# Patient Record
Sex: FEMALE | Race: WHITE | Employment: FULL TIME | URBAN - METROPOLITAN AREA
[De-identification: names, ages, dates, MRNs, and addresses within clinical notes are randomized per-mention and may not be internally consistent; named-entity substitution may affect disease eponyms.]

---

## 2019-09-01 ENCOUNTER — HOSPITAL ENCOUNTER (INPATIENT)
Age: 64
LOS: 4 days | Discharge: HOME | DRG: 246 | End: 2019-09-05
Attending: EMERGENCY MEDICINE | Admitting: HOSPITALIST
Payer: COMMERCIAL

## 2019-09-01 DIAGNOSIS — I21.4 NSTEMI (NON-ST ELEVATED MYOCARDIAL INFARCTION) (HCC): ICD-10-CM

## 2019-09-01 DIAGNOSIS — R74.8 CARDIAC ENZYMES ELEVATED: ICD-10-CM

## 2019-09-01 DIAGNOSIS — R07.89 CHEST TIGHTNESS: Primary | ICD-10-CM

## 2019-09-01 DIAGNOSIS — R07.9 CHEST PAIN, UNSPECIFIED TYPE: ICD-10-CM

## 2019-09-01 LAB
ALBUMIN SERPL-MCNC: 4 G/DL (ref 3.5–5)
ALBUMIN/GLOB SERPL: 1.1 {RATIO} (ref 1.1–2.2)
ALP SERPL-CCNC: 134 U/L (ref 45–117)
ALT SERPL-CCNC: 22 U/L (ref 12–78)
ANION GAP SERPL CALC-SCNC: 7 MMOL/L (ref 5–15)
AST SERPL-CCNC: 16 U/L (ref 15–37)
ATRIAL RATE: 77 BPM
BASOPHILS # BLD: 0 K/UL (ref 0–0.1)
BASOPHILS NFR BLD: 0 % (ref 0–1)
BILIRUB SERPL-MCNC: 0.5 MG/DL (ref 0.2–1)
BUN SERPL-MCNC: 16 MG/DL (ref 6–20)
BUN/CREAT SERPL: 17 (ref 12–20)
CALCIUM SERPL-MCNC: 9.7 MG/DL (ref 8.5–10.1)
CALCULATED P AXIS, ECG09: 59 DEGREES
CALCULATED R AXIS, ECG10: -25 DEGREES
CALCULATED T AXIS, ECG11: -24 DEGREES
CHLORIDE SERPL-SCNC: 108 MMOL/L (ref 97–108)
CO2 SERPL-SCNC: 26 MMOL/L (ref 21–32)
COMMENT, HOLDF: NORMAL
CREAT SERPL-MCNC: 0.92 MG/DL (ref 0.55–1.02)
D DIMER PPP FEU-MCNC: 0.44 MG/L FEU (ref 0–0.65)
DIAGNOSIS, 93000: NORMAL
DIFFERENTIAL METHOD BLD: ABNORMAL
EOSINOPHIL # BLD: 0.1 K/UL (ref 0–0.4)
EOSINOPHIL NFR BLD: 1 % (ref 0–7)
ERYTHROCYTE [DISTWIDTH] IN BLOOD BY AUTOMATED COUNT: 14 % (ref 11.5–14.5)
GLOBULIN SER CALC-MCNC: 3.5 G/DL (ref 2–4)
GLUCOSE SERPL-MCNC: 107 MG/DL (ref 65–100)
HCT VFR BLD AUTO: 48.6 % (ref 35–47)
HGB BLD-MCNC: 16.1 G/DL (ref 11.5–16)
IMM GRANULOCYTES # BLD AUTO: 0 K/UL (ref 0–0.04)
IMM GRANULOCYTES NFR BLD AUTO: 0 % (ref 0–0.5)
LYMPHOCYTES # BLD: 2.2 K/UL (ref 0.8–3.5)
LYMPHOCYTES NFR BLD: 20 % (ref 12–49)
MCH RBC QN AUTO: 29.4 PG (ref 26–34)
MCHC RBC AUTO-ENTMCNC: 33.1 G/DL (ref 30–36.5)
MCV RBC AUTO: 88.8 FL (ref 80–99)
MONOCYTES # BLD: 0.5 K/UL (ref 0–1)
MONOCYTES NFR BLD: 5 % (ref 5–13)
NEUTS SEG # BLD: 8 K/UL (ref 1.8–8)
NEUTS SEG NFR BLD: 74 % (ref 32–75)
NRBC # BLD: 0 K/UL (ref 0–0.01)
NRBC BLD-RTO: 0 PER 100 WBC
P-R INTERVAL, ECG05: 142 MS
PLATELET # BLD AUTO: 335 K/UL (ref 150–400)
PMV BLD AUTO: 10.1 FL (ref 8.9–12.9)
POTASSIUM SERPL-SCNC: 4.2 MMOL/L (ref 3.5–5.1)
PROT SERPL-MCNC: 7.5 G/DL (ref 6.4–8.2)
Q-T INTERVAL, ECG07: 384 MS
QRS DURATION, ECG06: 78 MS
QTC CALCULATION (BEZET), ECG08: 434 MS
RBC # BLD AUTO: 5.47 M/UL (ref 3.8–5.2)
SAMPLES BEING HELD,HOLD: NORMAL
SODIUM SERPL-SCNC: 141 MMOL/L (ref 136–145)
TROPONIN I SERPL-MCNC: 0.08 NG/ML
TROPONIN I SERPL-MCNC: 0.18 NG/ML
VENTRICULAR RATE, ECG03: 77 BPM
WBC # BLD AUTO: 10.8 K/UL (ref 3.6–11)

## 2019-09-01 PROCEDURE — 93005 ELECTROCARDIOGRAM TRACING: CPT

## 2019-09-01 PROCEDURE — 99285 EMERGENCY DEPT VISIT HI MDM: CPT

## 2019-09-01 PROCEDURE — 85025 COMPLETE CBC W/AUTO DIFF WBC: CPT

## 2019-09-01 PROCEDURE — 99218 HC RM OBSERVATION: CPT

## 2019-09-01 PROCEDURE — 80053 COMPREHEN METABOLIC PANEL: CPT

## 2019-09-01 PROCEDURE — 65660000000 HC RM CCU STEPDOWN

## 2019-09-01 PROCEDURE — 36415 COLL VENOUS BLD VENIPUNCTURE: CPT

## 2019-09-01 PROCEDURE — 74011250636 HC RX REV CODE- 250/636: Performed by: INTERNAL MEDICINE

## 2019-09-01 PROCEDURE — 74011250637 HC RX REV CODE- 250/637: Performed by: INTERNAL MEDICINE

## 2019-09-01 PROCEDURE — 85379 FIBRIN DEGRADATION QUANT: CPT

## 2019-09-01 PROCEDURE — 84484 ASSAY OF TROPONIN QUANT: CPT

## 2019-09-01 RX ORDER — SODIUM CHLORIDE 0.9 % (FLUSH) 0.9 %
5-40 SYRINGE (ML) INJECTION AS NEEDED
Status: DISCONTINUED | OUTPATIENT
Start: 2019-09-01 | End: 2019-09-05 | Stop reason: HOSPADM

## 2019-09-01 RX ORDER — ENOXAPARIN SODIUM 100 MG/ML
1 INJECTION SUBCUTANEOUS EVERY 12 HOURS
Status: DISCONTINUED | OUTPATIENT
Start: 2019-09-01 | End: 2019-09-03

## 2019-09-01 RX ORDER — ACETAMINOPHEN 325 MG/1
650 TABLET ORAL
Status: DISCONTINUED | OUTPATIENT
Start: 2019-09-01 | End: 2019-09-05 | Stop reason: HOSPADM

## 2019-09-01 RX ORDER — TRIAMTERENE AND HYDROCHLOROTHIAZIDE 37.5; 25 MG/1; MG/1
1 CAPSULE ORAL DAILY
COMMUNITY
End: 2020-01-08

## 2019-09-01 RX ORDER — LOSARTAN POTASSIUM 50 MG/1
50 TABLET ORAL
Status: DISCONTINUED | OUTPATIENT
Start: 2019-09-01 | End: 2019-09-04

## 2019-09-01 RX ORDER — SODIUM CHLORIDE 0.9 % (FLUSH) 0.9 %
5-40 SYRINGE (ML) INJECTION EVERY 8 HOURS
Status: DISCONTINUED | OUTPATIENT
Start: 2019-09-01 | End: 2019-09-05 | Stop reason: HOSPADM

## 2019-09-01 RX ORDER — ROSUVASTATIN CALCIUM 10 MG/1
20 TABLET, COATED ORAL
Status: DISCONTINUED | OUTPATIENT
Start: 2019-09-01 | End: 2019-09-05 | Stop reason: HOSPADM

## 2019-09-01 RX ORDER — GUAIFENESIN 100 MG/5ML
81 LIQUID (ML) ORAL DAILY
Status: DISCONTINUED | OUTPATIENT
Start: 2019-09-02 | End: 2019-09-05 | Stop reason: HOSPADM

## 2019-09-01 RX ORDER — DILTIAZEM HYDROCHLORIDE 120 MG/1
120 CAPSULE, COATED, EXTENDED RELEASE ORAL DAILY
COMMUNITY
End: 2019-09-05

## 2019-09-01 RX ORDER — ROSUVASTATIN CALCIUM 5 MG/1
5 TABLET, COATED ORAL
COMMUNITY
End: 2019-09-05

## 2019-09-01 RX ORDER — OLMESARTAN MEDOXOMIL 20 MG/1
20 TABLET ORAL DAILY
COMMUNITY
End: 2019-09-05

## 2019-09-01 RX ORDER — CARVEDILOL 3.12 MG/1
3.12 TABLET ORAL 2 TIMES DAILY WITH MEALS
Status: DISCONTINUED | OUTPATIENT
Start: 2019-09-01 | End: 2019-09-05 | Stop reason: HOSPADM

## 2019-09-01 RX ADMIN — Medication 10 ML: at 20:36

## 2019-09-01 RX ADMIN — NITROGLYCERIN 1 INCH: 20 OINTMENT TOPICAL at 20:35

## 2019-09-01 RX ADMIN — ROSUVASTATIN CALCIUM 20 MG: 10 TABLET, COATED ORAL at 20:34

## 2019-09-01 RX ADMIN — CARVEDILOL 3.12 MG: 3.12 TABLET, FILM COATED ORAL at 17:57

## 2019-09-01 RX ADMIN — LOSARTAN POTASSIUM 50 MG: 50 TABLET ORAL at 20:34

## 2019-09-01 RX ADMIN — ENOXAPARIN SODIUM 80 MG: 80 INJECTION SUBCUTANEOUS at 17:57

## 2019-09-01 NOTE — Clinical Note
Lesion located in the Mid LAD. Balloon inserted. Balloon inflated using multiple inflations inflation technique. Inflation 2: Pressure: 8 lulu; Duration: 12 sec. Inflation 3: Pressure: 8 lulu; Duration: 11 sec.

## 2019-09-01 NOTE — Clinical Note
Lesion: Located in the Proximal LAD. Stent inserted. Stent deployed. First inflation pressure = 12 lulu; inflation time: 30 sec.

## 2019-09-01 NOTE — PROGRESS NOTES
Admission Medication Reconciliation:    Information obtained from:  Patient, pictures of Rx bottles  RxQuery data available¹:  NO    Comments/Recommendations: Updated PTA meds/reviewed patient's allergies. 1)  Patient is a pleasant, good historian who has no medications on her PTA last prior to this admission. She had pictures of all 4 of her Rx bottles on her phone which are used to update PTA med list as follows    2)  Medication changes (since last review): Added  - dilTIAZem CD (CARDIZEM CD) 120 mg ER capsule Sig: Take 120 mg by mouth daily. - olmesartan (BENICAR) 20 mg tablet Sig: Take 20 mg by mouth daily. - rosuvastatin (CRESTOR) 5 mg tablet Sig: Take 5 mg by mouth nightly. - sour cherry extract (TART CHERRY EXTRACT) 1,000 mg cap Sig: Take 2 Caps by mouth two (2) times a day. Takes 4 capsules twice daily for active gout flares   Indications: gout  - triamterene-hydroCHLOROthiazide (DYAZIDE) 37.5-25 mg per capsule Sig: Take 1 Cap by mouth daily  - vitamin C-multivitamin-mineral (EMERGEN-C) 500 mg chew Sig: Take 3 Tabs by mouth two (2) times a day. Adjusted  - none    Removed  - none    3)  No further follow-up     [address] [place] [address] benefit data reflects medications filled and processed through the patient's insurance, however   this data does NOT capture whether the medication was picked up or is currently being taken by the patient. Allergies:  Morphine; Pcn [penicillins]; and Renografin-60 [diatrizoate oracio-diatrizoat sod]    Significant PMH/Disease States:   Past Medical History:   Diagnosis Date    Gout     Hypertension     Kidney stone      Chief Complaint for this Admission:    Chief Complaint   Patient presents with    Chest Pain     Prior to Admission Medications:   Prior to Admission Medications   Prescriptions Last Dose Informant Patient Reported? Taking?   dilTIAZem CD (CARDIZEM CD) 120 mg ER capsule 9/1/2019 at Unknown time  Yes Yes   Sig: Take 120 mg by mouth daily.    olmesartan (BENICAR) 20 mg tablet 8/31/2019 at Unknown time  Yes Yes   Sig: Take 20 mg by mouth daily. rosuvastatin (CRESTOR) 5 mg tablet 8/31/2019 at Unknown time  Yes Yes   Sig: Take 5 mg by mouth nightly. sour cherry extract (TART CHERRY EXTRACT) 1,000 mg cap   Yes Yes   Sig: Take 2 Caps by mouth two (2) times a day. Takes 4 capsules twice daily for active gout flares   Indications: gout   triamterene-hydroCHLOROthiazide (DYAZIDE) 37.5-25 mg per capsule 9/1/2019 at Unknown time  Yes Yes   Sig: Take 1 Cap by mouth daily. vitamin C-multivitamin-mineral (EMERGEN-C) 500 mg chew   Yes Yes   Sig: Take 3 Tabs by mouth two (2) times a day. Facility-Administered Medications: None       Please contact the main inpatient pharmacy with any questions or concerns at [phone] and we will direct you to the clinical pharmacist covering this patient's care while in-house.     Thank you,   Brenda Ball, PHARMD

## 2019-09-01 NOTE — Clinical Note
Lesion located in the Proximal LAD. Balloon removed. Balloon inflated using multiple inflations inflation technique. Pressure = 10 lulu; Duration = 22 sec. Inflation 2: Pressure: 10 lulu; Duration: 36 sec.

## 2019-09-01 NOTE — Clinical Note
Lesion: Located in the Mid RCA. Stent inserted. Stent deployed. First inflation pressure = 15 lulu; inflation time: 30 sec.

## 2019-09-01 NOTE — ED PROVIDER NOTES
59 y.o. female with past medical history significant for gout, and HTN who presents from [facility] via family with chief complaint of chest pain. Pt states she began with chest \"tightness' on friday when she was back to school shopping with her grandchildren. Pt explains she sat down for a few moments and her symptoms subsided. Pt then explains she was at [facility] and her tightness came back. Pt states she had to stop and sit down multiple times and it just \"wouldn't pass\". Pt states today she has the same tightness and decided to go to [facility]. Pt states the reason she decided to seek medical attention today because her symptoms returned with only slight movement. At [facility] she was given 4 ASA. Pt states she has had similar episodes before however it would be \"one time and go away\". Today her longest episode of tightness was approximately 20 minutes. Pt denies hx of diabetes. Pt denies any SOB, leg pain, and abdominal pain. There are no other acute medical concerns at this time. Social hx: Current Smoker,   Significant FMHx: Father 4 Vessel Bypass, high b/p  PCP: Mahi Bain MD      Note written by Nita Lord, as dictated by Mal David MD 2:35 PM      The history is provided by the patient. No  was used. Past Medical History:   Diagnosis Date    Gout     Hypertension     Kidney stone        Past Surgical History:   Procedure Laterality Date    HX HYSTERECTOMY           No family history on file.     Social History     Socioeconomic History    Marital status: UNKNOWN     Spouse name: Not on file    Number of children: Not on file    Years of education: Not on file    Highest education level: Not on file   Occupational History    Not on file   Social Needs    Financial resource strain: Not on file    Food insecurity:     Worry: Not on file     Inability: Not on file    Transportation needs:     Medical: Not on file Non-medical: Not on file   Tobacco Use    Smoking status: Not on file   Substance and Sexual Activity    Alcohol use: Not on file    Drug use: Not on file    Sexual activity: Not on file   Lifestyle    Physical activity:     Days per week: Not on file     Minutes per session: Not on file    Stress: Not on file   Relationships    Social connections:     Talks on phone: Not on file     Gets together: Not on file     Attends Oriental orthodox service: Not on file     Active member of club or organization: Not on file     Attends meetings of clubs or organizations: Not on file     Relationship status: Not on file    Intimate partner violence:     Fear of current or ex partner: Not on file     Emotionally abused: Not on file     Physically abused: Not on file     Forced sexual activity: Not on file   Other Topics Concern    Not on file   Social History Narrative    Not on file         ALLERGIES: Morphine; Pcn [penicillins]; and Renografin-60 [diatrizoate oracio-diatrizoat sod]    Review of Systems   Constitutional: Negative for activity change, appetite change and fatigue. HENT: Negative for ear pain, facial swelling, sore throat and trouble swallowing. Eyes: Negative for pain, discharge and visual disturbance. Respiratory: Positive for chest tightness. Negative for shortness of breath and wheezing. Cardiovascular: Negative for chest pain and palpitations. Gastrointestinal: Negative for abdominal pain, blood in stool, nausea and vomiting. Genitourinary: Negative for difficulty urinating, flank pain and hematuria. Musculoskeletal: Negative for arthralgias, joint swelling, myalgias and neck pain. Skin: Negative for color change and rash. Neurological: Negative for dizziness, weakness, numbness and headaches. Hematological: Negative for adenopathy. Does not bruise/bleed easily. Psychiatric/Behavioral: Negative for behavioral problems, confusion and sleep disturbance.    All other systems reviewed and are negative. Vitals:    09/01/19 1255 09/01/19 1403   BP:  150/85   Pulse: 75 61   Resp:  16   Temp:  98.1 °F (36.7 °C)   SpO2: 98% 95%   Weight: 81.6 kg (180 lb)    Height: 5' 2\" (1.575 m)             Physical Exam   Constitutional: She is oriented to person, place, and time. She appears well-developed and well-nourished. No distress. HENT:   Head: Normocephalic and atraumatic. Nose: Nose normal.   Mouth/Throat: Oropharynx is clear and moist.   Eyes: Pupils are equal, round, and reactive to light. Conjunctivae and EOM are normal. No scleral icterus. Neck: Normal range of motion. Neck supple. No JVD present. No tracheal deviation present. No thyromegaly present. No carotid bruits noted. Cardiovascular: Normal rate, regular rhythm, normal heart sounds and intact distal pulses. Exam reveals no gallop and no friction rub. No murmur heard. Pulmonary/Chest: Effort normal and breath sounds normal. No respiratory distress. She has no wheezes. She has no rales. She exhibits no tenderness. Abdominal: Soft. Bowel sounds are normal. She exhibits no distension and no mass. There is no tenderness. There is no rebound and no guarding. Musculoskeletal: Normal range of motion. She exhibits no edema or tenderness. Lymphadenopathy:     She has no cervical adenopathy. Neurological: She is alert and oriented to person, place, and time. She has normal reflexes. No cranial nerve deficit. Coordination normal.   Skin: Skin is warm and dry. No rash noted. No erythema. Psychiatric: She has a normal mood and affect. Her behavior is normal. Judgment and thought content normal.   Nursing note and vitals reviewed.      Note written by Nita Drake, as dictated by May Eller MD 2:35 PM    MDM  Number of Diagnoses or Management Options  Cardiac enzymes elevated: new and requires workup  Chest tightness: new and requires workup     Amount and/or Complexity of Data Reviewed  Clinical lab tests: reviewed and ordered  Tests in the radiology section of CPT®: reviewed and ordered  Decide to obtain previous medical records or to obtain history from someone other than the patient: yes  Obtain history from someone other than the patient: yes  Review and summarize past medical records: yes  Discuss the patient with other providers: yes  Independent visualization of images, tracings, or specimens: yes    Risk of Complications, Morbidity, and/or Mortality  Presenting problems: high  Diagnostic procedures: high  Management options: high    Patient Progress  Patient progress: stable         Procedures      ED EKG interpretation:  Rhythm: Arhythmia ; and irregular. Rate (approx.): 77bpm; Axis: left axis deviation; Normal intervals; T wave inversion VIII-VI of questionable significance. Note written by Nita Bhatt, as dictated by Angel Marquez MD 2:48 PM     PROGRESS NOTE:  3:16 PM  Cardiology consult    Hospitalist Adrien for Admission  3:16 PM    ED Room Number: ER08/08  Patient Name and age: Emma Ruiz 59 y.o.  female  Working Diagnosis:   1. Chest tightness    2. Cardiac enzymes elevated      Readmission: no  Isolation Requirements:  no  Recommended Level of Care:  telemetry  Code Status:  Full Code  Department:[facility] Adult ED - 21   Other:       The patient's EKG reveals only some T wave inversion laterally. The troponin is 0.08 and the patient states that it was normal this more. Chest pain or tightness currently. She denies any shortness of breath and is feeling normal at this point. I have spoken with Dr. Kia Lazaro and he will see the patient in consult. Patient has had 4 aspirin per EMS. Hemodynamically she is stable. The patient will be admitted for possible unstable angina.   She voices understanding and is in

## 2019-09-01 NOTE — Clinical Note
Lesion located in the Proximal LAD. Balloon removed. Balloon inflated using multiple inflations inflation technique. Pressure = 12 lulu; Duration = 25 sec. Inflation 2: Pressure: 12 lulu; Duration: 22 sec. Inflation 3: Pressure: 12 lulu; Duration: 25 sec.

## 2019-09-01 NOTE — CONSULTS
S Cardiology Consult Note    Date of consult:  09/01/19  Date of admission: 9/1/2019  Primary Cardiologist: none  Physician Requesting consult: Dr Farrah Espinoza / Reason for consult: chest pain    History of the presenting illness: Jonas Jackson is a 59 y.o. who presented initially to [facility] urgent care with complaints of chest pain. She states that the chest pressure initially came on Friday. The pain was described as a tightness, located in the upper sternal area. It did not seem to radiate. The pain came and went yesterday when she was at [facility]. Seemed to have quite a close relationship to exertion - walking would bring on the pain and it would go away with rest. She has some associated clammy / sweaty spells associated with the pain at times as well. Pain seemed stronger today which lead her to seek medical attention. She received 4 baby aspirin and was sent to the ER [facility] ER. EKG shows sinus rhythm with PACs and NSTW changes, no definite ischemia. Her initial troponin here is marginally elevated at 0.08    Medical problems include HTN and hyperlipidemia. She is also a smoker, and has a FH of early CAD (her father had bypass at an early age). Past Medical History:   Diagnosis Date    Gout     Hypertension     Kidney stone        Prior to Admission medications    Medication Sig Start Date End Date Taking? Authorizing Provider   triamterene-hydroCHLOROthiazide (DYAZIDE) 37.5-25 mg per capsule Take 1 Cap by mouth daily. Yes Provider, Historical   dilTIAZem CD (CARDIZEM CD) 120 mg ER capsule Take 120 mg by mouth daily. Yes Provider, Historical   rosuvastatin (CRESTOR) 5 mg tablet Take 5 mg by mouth nightly. Yes Provider, Historical   olmesartan (BENICAR) 20 mg tablet Take 20 mg by mouth daily. Yes Provider, Historical   sour cherry extract (TART CHERRY EXTRACT) 1,000 mg cap Take 2 Caps by mouth two (2) times a day.  Takes 4 capsules twice daily for active gout flares Indications: gout   Yes Provider, Historical   vitamin C-multivitamin-mineral (EMERGEN-C) 500 mg chew Take 3 Tabs by mouth two (2) times a day. Yes Provider, Historical       No family history on file. Social History     Socioeconomic History    Marital status: UNKNOWN     Spouse name: Not on file    Number of children: Not on file    Years of education: Not on file    Highest education level: Not on file   Occupational History    Not on file   Social Needs    Financial resource strain: Not on file    Food insecurity:     Worry: Not on file     Inability: Not on file    Transportation needs:     Medical: Not on file     Non-medical: Not on file   Tobacco Use    Smoking status: Not on file   Substance and Sexual Activity    Alcohol use: Not on file    Drug use: Not on file    Sexual activity: Not on file   Lifestyle    Physical activity:     Days per week: Not on file     Minutes per session: Not on file    Stress: Not on file   Relationships    Social connections:     Talks on phone: Not on file     Gets together: Not on file     Attends Mandaen service: Not on file     Active member of club or organization: Not on file     Attends meetings of clubs or organizations: Not on file     Relationship status: Not on file    Intimate partner violence:     Fear of current or ex partner: Not on file     Emotionally abused: Not on file     Physically abused: Not on file     Forced sexual activity: Not on file   Other Topics Concern    Not on file   Social History Narrative    Not on file       Review of Systems   Constitutional: Negative for chills and fever. HENT: Negative for hearing loss and nosebleeds. Eyes: Negative for blurred vision and double vision. Respiratory: Negative for cough and sputum production. Cardiovascular: Positive for chest pain. Gastrointestinal: Negative for abdominal pain, nausea and vomiting. Genitourinary: Negative for frequency and urgency.    Musculoskeletal: Negative for back pain and joint pain. Skin: Negative for itching and rash. Neurological: Negative for dizziness and headaches. Endo/Heme/Allergies: Negative for environmental allergies. Does not bruise/bleed easily. Visit Vitals  /85 (BP 1 Location: Left arm, BP Patient Position: At rest)   Pulse 60   Temp 98.2 °F (36.8 °C)   Resp 17   Ht 5' 2\" (1.575 m)   Wt 81.6 kg (180 lb)   SpO2 97%   BMI 32.92 kg/m²     Physical Exam   Constitutional: She is oriented to person, place, and time and well-developed, well-nourished, and in no distress. HENT:   Head: Normocephalic and atraumatic. Eyes: Conjunctivae are normal. No scleral icterus. Neck: No JVD present. Cardiovascular: Normal rate, regular rhythm and normal heart sounds. Exam reveals no gallop. No murmur heard. Pulmonary/Chest: Effort normal and breath sounds normal. No stridor. No respiratory distress. She has no wheezes. Abdominal: Soft. She exhibits no distension. Musculoskeletal: Normal range of motion. She exhibits no deformity. Neurological: She is alert and oriented to person, place, and time. Skin: Skin is warm and dry.    Psychiatric: Mood and affect normal.       Lab review:  BMP:   Lab Results   Component Value Date/Time     09/01/2019 12:59 PM    K 4.2 09/01/2019 12:59 PM     09/01/2019 12:59 PM    CO2 26 09/01/2019 12:59 PM    AGAP 7 09/01/2019 12:59 PM     (H) 09/01/2019 12:59 PM    BUN 16 09/01/2019 12:59 PM    CREA 0.92 09/01/2019 12:59 PM    GFRAA >60 09/01/2019 12:59 PM    GFRNA >60 09/01/2019 12:59 PM        CBC:  Lab Results   Component Value Date/Time    WBC 10.8 09/01/2019 12:59 PM    HGB 16.1 (H) 09/01/2019 12:59 PM    HCT 48.6 (H) 09/01/2019 12:59 PM    PLATELET 358 08/64/9003 12:59 PM    MCV 88.8 09/01/2019 12:59 PM       All Cardiac Markers in the last 24 hours:    Lab Results   Component Value Date/Time    TROIQ 0.08 (H) 09/01/2019 12:59 PM       Data review:  EKG tracing personally reviewed:  sinus rhythm with PACs and NSTW changes, no definite ischemia. Assessment:    1. NSTEMI  2. HTN  3. Hyperlipidemia  4. Tobacco use    Her symptoms are fairly typical for a crescendo pattern unstable angina. Troponin is marginally abnormal, but in the context of her symptoms and risk factors, I think she does truly seem to have ACS. Recommendations / Plan:    Would check troponin every 6hrs until peaked. Continue with aspirin, and will add Lovenox 1mg/kg every 12hrs  Add coreg in place of her usual diltiazem  Increase rosuvastatin dose to 20mg daily. Repeat AM lipids  Echocardiogram for EF assessment    Will plan on [address] [place] [address] Tuesday morning, possible PCI. Scheduled for Tues 7am.   Please keep her NPO from [place] Monday night. She is being admitted to the hospitalist service. Signed:  Paola Adam [facility]  Interventional Cardiology  09/01/19

## 2019-09-01 NOTE — Clinical Note
Lesion located in the Proximal LAD. Balloon inserted. Balloon inflated using multiple inflations inflation technique. Pressure = 6 lulu; Duration = 25 sec. Inflation 2: Pressure: 6 lulu; Duration: 30 sec.

## 2019-09-01 NOTE — Clinical Note
Lesion: Located in the Proximal RCA. Stent inserted. Stent deployed. First inflation pressure = 20 lulu; inflation time: 30 sec.

## 2019-09-01 NOTE — Clinical Note
Lesion located in the Proximal RCA. Balloon balloon re-inflated. Pressure = 12 lulu; Duration = 22 sec.

## 2019-09-01 NOTE — Clinical Note
Lesion located in the Proximal RCA. Balloon inserted. Balloon inflated using single inflation technique. Pressure = 12 lulu; Duration = 20 sec.

## 2019-09-01 NOTE — Clinical Note
Lesion: Located in the Mid LAD. Stent inserted. Multiple inflations used. First inflation pressure = 12 lulu; inflation time: 30 sec. Second inflation pressure: 14 lulu; inflation time: 30 sec.

## 2019-09-01 NOTE — Clinical Note
Lesion located in the Proximal RCA. Balloon inserted. Balloon inflated using multiple inflations inflation technique. Pressure = 18 lulu; Duration = 40 sec. Inflation 2: Pressure: 20 lulu; Duration: 30 sec.

## 2019-09-01 NOTE — ED TRIAGE NOTES
Arrived form home with  c/o mid sternum chest pain since Friday. Pain worsen with movement. Patient went to [facility] today and received 4 baby ASA.

## 2019-09-01 NOTE — ED NOTES
1751 TRANSFER - OUT REPORT:    Verbal report given to Emily(name) on Iliana Aguilera  being transferred to [facility](unit) for routine progression of care       Report consisted of patients Situation, Background, Assessment and   Recommendations(SBAR). Information from the following report(s) SBAR was reviewed with the receiving nurse. Lines:   Peripheral IV 09/01/19 Right Antecubital (Active)        Opportunity for questions and clarification was provided.       Patient transported with:   Monitor  Registered Nurse  Tech

## 2019-09-02 LAB
CHOLEST SERPL-MCNC: 196 MG/DL
HDLC SERPL-MCNC: 25 MG/DL
HDLC SERPL: 7.8 {RATIO} (ref 0–5)
LDLC SERPL CALC-MCNC: 121.4 MG/DL (ref 0–100)
LIPID PROFILE,FLP: ABNORMAL
TRIGL SERPL-MCNC: 248 MG/DL (ref ?–150)
TROPONIN I SERPL-MCNC: 0.13 NG/ML
VLDLC SERPL CALC-MCNC: 49.6 MG/DL

## 2019-09-02 PROCEDURE — 74011250636 HC RX REV CODE- 250/636: Performed by: INTERNAL MEDICINE

## 2019-09-02 PROCEDURE — 36415 COLL VENOUS BLD VENIPUNCTURE: CPT

## 2019-09-02 PROCEDURE — 74011250637 HC RX REV CODE- 250/637: Performed by: INTERNAL MEDICINE

## 2019-09-02 PROCEDURE — 65660000000 HC RM CCU STEPDOWN

## 2019-09-02 PROCEDURE — 80061 LIPID PANEL: CPT

## 2019-09-02 PROCEDURE — 99218 HC RM OBSERVATION: CPT

## 2019-09-02 RX ORDER — IBUPROFEN 200 MG
1 TABLET ORAL EVERY 24 HOURS
Status: DISCONTINUED | OUTPATIENT
Start: 2019-09-02 | End: 2019-09-05 | Stop reason: HOSPADM

## 2019-09-02 RX ADMIN — ROSUVASTATIN CALCIUM 20 MG: 10 TABLET, COATED ORAL at 21:36

## 2019-09-02 RX ADMIN — LOSARTAN POTASSIUM 50 MG: 50 TABLET ORAL at 08:39

## 2019-09-02 RX ADMIN — ENOXAPARIN SODIUM 80 MG: 80 INJECTION SUBCUTANEOUS at 18:12

## 2019-09-02 RX ADMIN — ENOXAPARIN SODIUM 80 MG: 80 INJECTION SUBCUTANEOUS at 05:12

## 2019-09-02 RX ADMIN — CARVEDILOL 3.12 MG: 3.12 TABLET, FILM COATED ORAL at 08:39

## 2019-09-02 RX ADMIN — Medication 10 ML: at 21:36

## 2019-09-02 RX ADMIN — ACETAMINOPHEN 650 MG: 325 TABLET, FILM COATED ORAL at 21:36

## 2019-09-02 RX ADMIN — CARVEDILOL 3.12 MG: 3.12 TABLET, FILM COATED ORAL at 18:12

## 2019-09-02 RX ADMIN — Medication 10 ML: at 15:38

## 2019-09-02 RX ADMIN — ASPIRIN 81 MG CHEWABLE TABLET 81 MG: 81 TABLET CHEWABLE at 08:39

## 2019-09-02 RX ADMIN — Medication 10 ML: at 05:09

## 2019-09-02 NOTE — PROGRESS NOTES
2030: Bedside and Verbal shift change report given to Joon Liriano RN (oncoming nurse) by Gianna Grande RN (offgoing nurse). Report included the following information SBAR, Kardex, Intake/Output, MAR, Recent Results and Cardiac Rhythm NSR.   1930: Bedside and Verbal shift change report given to [place] [id number] (oncoming nurse) by Joon Liriano RN (offgoing nurse). Report included the following information SBAR, Kardex, Intake/Output, MAR, Recent Results and Cardiac Rhythm NSR/SB.

## 2019-09-02 NOTE — PROGRESS NOTES
Cardiology Progress Note  2019     Admit Date: 2019  Admit Diagnosis: Chest pain [R07.9]  CC: none currently    Assessment:   Active Problems:    Chest pain (2019)      Plan:     Troponin downtrending, no need to recheck  NPO at [facility] for LHC  Cont lovenox, ASA, BB, statin  Echo    Subjective: Emily Flanagan has had no more pain. Trop downtrending      Objective:    Physical Exam:  Overall VSSAF;    Visit Vitals  /68 (BP 1 Location: Left arm, BP Patient Position: At rest)   Pulse 63   Temp 98.2 °F (36.8 °C)   Resp 16   Ht 5' 2\" (1.575 m)   Wt 80.4 kg (177 lb 4 oz)   SpO2 93%   BMI 32.42 kg/m²     Temp (24hrs), Av.1 °F (36.7 °C), Min:97.9 °F (36.6 °C), Max:98.2 °F (36.8 °C)    Patient Vitals for the past 8 hrs:   Pulse   19 0752 63   19 0500 71    Patient Vitals for the past 8 hrs:   Resp   19 0752 16   19 0500 18    Patient Vitals for the past 8 hrs:   BP   19 0752 127/68   19 0500 125/72       1901 -  0700  In: 120 [P.O.:120]  Out: 0       General Appearance: Well developed, well nourished, no acute distress. Ears/Nose/Mouth/Throat:   Normal MM; anicteric. JVP: WNL   Resp:   Lungs clear to auscultation bilaterally. Nl resp effort. Cardiovascular:  RRR, S1, S2 normal, no new murmur. No gallop or rub. Abdomen:   Soft, non-tender, bowel sounds are present. Extremities: No edema bilaterally. Skin:  Neuro: Warm and dry.   A/O x3, grossly nonfocal                         Data Review:     Telemetry independently reviewed :   normal sinus rhythm         Labs:   Recent Results (from the past 24 hour(s))   EKG, 12 LEAD, INITIAL    Collection Time: 19 12:52 PM   Result Value Ref Range    Ventricular Rate 77 BPM    Atrial Rate 77 BPM    P-R Interval 142 ms    QRS Duration 78 ms    Q-T Interval 384 ms    QTC Calculation (Bezet) 434 ms    Calculated P Axis 59 degrees    Calculated R Axis -25 degrees    Calculated T Axis -24 degrees Diagnosis       Sinus rhythm with occasional premature atrial complexes  Nonspecific T wave abnormality  When compared with ECG of 25-JAN-2007 12:36,  No significant change was found  Confirmed by Enrike Hogan M.D., [facility] ([id number]) on 9/1/2019 6:40:16 PM     SAMPLES BEING HELD    Collection Time: 09/01/19 12:59 PM   Result Value Ref Range    SAMPLES BEING HELD 1 pst, 1 lav, 1 blue, 1 red     COMMENT        Add-on orders for these samples will be processed based on acceptable specimen integrity and analyte stability, which may vary by analyte. METABOLIC PANEL, COMPREHENSIVE    Collection Time: 09/01/19 12:59 PM   Result Value Ref Range    Sodium 141 136 - 145 mmol/L    Potassium 4.2 3.5 - 5.1 mmol/L    Chloride 108 97 - 108 mmol/L    CO2 26 21 - 32 mmol/L    Anion gap 7 5 - 15 mmol/L    Glucose 107 (H) 65 - 100 mg/dL    BUN 16 6 - 20 MG/DL    Creatinine 0.92 0.55 - 1.02 MG/DL    BUN/Creatinine ratio 17 12 - 20      GFR est AA >60 >60 ml/min/1.73m2    GFR est non-AA >60 >60 ml/min/1.73m2    Calcium 9.7 8.5 - 10.1 MG/DL    Bilirubin, total 0.5 0.2 - 1.0 MG/DL    ALT (SGPT) 22 12 - 78 U/L    AST (SGOT) 16 15 - 37 U/L    Alk. phosphatase 134 (H) 45 - 117 U/L    Protein, total 7.5 6.4 - 8.2 g/dL    Albumin 4.0 3.5 - 5.0 g/dL    Globulin 3.5 2.0 - 4.0 g/dL    A-G Ratio 1.1 1.1 - 2.2     CBC WITH AUTOMATED DIFF    Collection Time: 09/01/19 12:59 PM   Result Value Ref Range    WBC 10.8 3.6 - 11.0 K/uL    RBC 5.47 (H) 3.80 - 5.20 M/uL    HGB 16.1 (H) 11.5 - 16.0 g/dL    HCT 48.6 (H) 35.0 - 47.0 %    MCV 88.8 80.0 - 99.0 FL    MCH 29.4 26.0 - 34.0 PG    MCHC 33.1 30.0 - 36.5 g/dL    RDW 14.0 11.5 - 14.5 %    PLATELET 275 517 - 177 K/uL    MPV 10.1 8.9 - 12.9 FL    NRBC 0.0 0  WBC    ABSOLUTE NRBC 0.00 0.00 - 0.01 K/uL    NEUTROPHILS 74 32 - 75 %    LYMPHOCYTES 20 12 - 49 %    MONOCYTES 5 5 - 13 %    EOSINOPHILS 1 0 - 7 %    BASOPHILS 0 0 - 1 %    IMMATURE GRANULOCYTES 0 0.0 - 0.5 %    ABS.  NEUTROPHILS 8.0 1.8 - 8.0 K/UL ABS. LYMPHOCYTES 2.2 0.8 - 3.5 K/UL    ABS. MONOCYTES 0.5 0.0 - 1.0 K/UL    ABS. EOSINOPHILS 0.1 0.0 - 0.4 K/UL    ABS. BASOPHILS 0.0 0.0 - 0.1 K/UL    ABS. IMM.  GRANS. 0.0 0.00 - 0.04 K/UL    DF AUTOMATED     TROPONIN I    Collection Time: 09/01/19 12:59 PM   Result Value Ref Range    Troponin-I, Qt. 0.08 (H) <0.05 ng/mL   D DIMER    Collection Time: 09/01/19 12:59 PM   Result Value Ref Range    D-dimer 0.44 0.00 - 0.65 mg/L FEU   TROPONIN I    Collection Time: 09/01/19  5:49 PM   Result Value Ref Range    Troponin-I, Qt. 0.18 (H) <0.05 ng/mL   TROPONIN I    Collection Time: 09/01/19 11:34 PM   Result Value Ref Range    Troponin-I, Qt. 0.13 (H) <0.05 ng/mL   LIPID PANEL    Collection Time: 09/02/19  5:12 AM   Result Value Ref Range    LIPID PROFILE          Cholesterol, total 196 <200 MG/DL    Triglyceride 248 (H) <150 MG/DL    HDL Cholesterol 25 MG/DL    LDL, calculated 121.4 (H) 0 - 100 MG/DL    VLDL, calculated 49.6 MG/DL    CHOL/HDL Ratio 7.8 (H) 0.0 - 5.0        Current medications reviewed       Nayeli Alcantar MD

## 2019-09-02 NOTE — PROGRESS NOTES
0730: Bedside and Verbal shift change report given to Treva [facility] (oncoming nurse) by Adrienne Nelson RN (offgoing nurse).  Report included the following information SBAR, Kardex, Intake/Output, MAR, Accordion, Recent Results and Cardiac Rhythm NSR.     1150: cardiology at bedside, plans for left heart cath tomorrow

## 2019-09-02 NOTE — PROGRESS NOTES
Hospitalist Progress Note  Brenda Gomez MD  Answering service: [phone] OR [phone] from in house phone        Date of Service:  2019  NAME:  Chandler Lombard  :  1955  MRN:  [id number]      Admission Summary:   The patient is a 52-year-old lady with a history of hypertension, who presented to [facility] earlier today, complaining of recurrent chest pain. She noted chest pain, described as tightness in the middle of her chest that is worse with exertion. It did not radiate and this pain was associated with diaphoresis and some difficulty breathing. She was given aspirin and was referred to the emergency department. She is now chest pain free, at rest, has no other complaints    Interval history / Subjective:   Doing OK , [facility] planned for tuesday     Assessment & Plan:      1. Unstable angina with NSTEMI - troponin peaked , on full a/c with lovenox cardio following    2. HTN- on BB + ACE  3. Hyperlipidemia- on statin  4. Tobacco use- - counseled cessation  5. Echocardiogram for EF assessment    Code status: Full  DVT prophylaxis: On lovenox    Care Plan discussed with: Patient/Family and Nurse  Disposition: TBD     Hospital Problems  Never Reviewed          Codes Class Noted POA    Chest pain ICD-10-CM: R07.9  ICD-9-CM: 786.50  2019 Unknown                Review of Systems:   A comprehensive review of systems was negative. Vital Signs:    Last 24hrs VS reviewed since prior progress note.  Most recent are:  Visit Vitals  /68 (BP 1 Location: Left arm, BP Patient Position: At rest)   Pulse 63   Temp 98.2 °F (36.8 °C)   Resp 16   Ht 5' 2\" (1.575 m)   Wt 80.4 kg (177 lb 4 oz)   SpO2 93%   BMI 32.42 kg/m²         Intake/Output Summary (Last 24 hours) at 2019 1059  Last data filed at 2019 0752  Gross per 24 hour   Intake 360 ml   Output 0 ml   Net 360 ml        Physical Examination:             Constitutional:  No acute distress, cooperative, pleasant    ENT:  Oral mucous moist, oropharynx benign. Neck supple,    Resp:  CTA bilaterally. No wheezing/rhonchi/rales. No accessory muscle use   CV:  Regular rhythm, normal rate, no murmurs, gallops, rubs    GI:  Soft, non distended, non tender. normoactive bowel sounds, no hepatosplenomegaly     Musculoskeletal:  No edema, warm, 2+ pulses throughout    Neurologic:  Moves all extremities. AAOx3, CN II-XII reviewed     Psych:  Good insight, Not anxious nor agitated. Data Review:    I personally reviewed  Image and labs      Labs:     Recent Labs     09/01/19  1259   WBC 10.8   HGB 16.1*   HCT 48.6*        Recent Labs     09/01/19  1259      K 4.2      CO2 26   BUN 16   CREA 0.92   *   CA 9.7     Recent Labs     09/01/19  1259   SGOT 16   ALT 22   *   TBILI 0.5   TP 7.5   ALB 4.0   GLOB 3.5     No results for input(s): INR, PTP, APTT in the last 72 hours. No lab exists for component: INREXT   No results for input(s): FE, TIBC, PSAT, FERR in the last 72 hours. No results found for: FOL, RBCF   No results for input(s): PH, PCO2, PO2 in the last 72 hours.   Recent Labs     09/01/19  2334 09/01/19  1749 09/01/19  1259   TROIQ 0.13* 0.18* 0.08*     Lab Results   Component Value Date/Time    Cholesterol, total 196 09/02/2019 05:12 AM    HDL Cholesterol 25 09/02/2019 05:12 AM    LDL, calculated 121.4 (H) 09/02/2019 05:12 AM    Triglyceride 248 (H) 09/02/2019 05:12 AM    CHOL/HDL Ratio 7.8 (H) 09/02/2019 05:12 AM     No results found for: GLUCPOC  No results found for: COLOR, APPRN, SPGRU, REFSG, JEFF, PROTU, GLUCU, KETU, BILU, UROU, LEONARD, LEUKU, GLUKE, EPSU, BACTU, WBCU, RBCU, CASTS, UCRY      Medications Reviewed:     Current Facility-Administered Medications   Medication Dose Route Frequency    sodium chloride (NS) flush 5-40 mL  5-40 mL IntraVENous Q8H    sodium chloride (NS) flush 5-40 mL  5-40 mL IntraVENous PRN    acetaminophen (TYLENOL) tablet 650 mg  650 mg Oral Q4H PRN    aspirin chewable tablet 81 mg  81 mg Oral DAILY    enoxaparin (LOVENOX) injection 80 mg  1 mg/kg SubCUTAneous Q12H    carvedilol (COREG) tablet 3.125 mg  3.125 mg Oral BID WITH MEALS    rosuvastatin (CRESTOR) tablet 20 mg  20 mg Oral QHS    losartan (COZAAR) tablet 50 mg  50 mg Oral DAILY WITH BREAKFAST     ______________________________________________________________________  EXPECTED LENGTH OF STAY: - - -  ACTUAL LENGTH OF STAY:          0                 Shreya Carbajal MD

## 2019-09-02 NOTE — H&P
[address]   HISTORY AND PHYSICAL    Name:  Glory Parnell  MR#:  [id number]  :  1955  ACCOUNT #:  [de-identified]  ADMIT DATE:  2019      CHIEF COMPLAINT:  Chest pain and difficulty breathing. HISTORY OF PRESENT ILLNESS:  The patient is a 60-year-old lady with a history of hypertension, who presented to [facility] earlier today, complaining of recurrent chest pain. She noted chest pain, described as tightness in the middle of her chest that is worse with exertion. It did not radiate and this pain was associated with diaphoresis and some difficulty breathing. She was given aspirin and was referred to the emergency department. She is now chest pain free, at rest, has no other complaints. PAST MEDICAL HISTORY:    1. Hypertension. 2.  Gout. 3.  Hyperlipidemia. ALLERGIES:  INCLUDE MORPHINE, PENICILLIN AND RENOGRAFIN. MEDICATIONS:  List reviewed includes,  1. Diltiazem  mg daily. 2.  Benicar 20 mg daily. 3.  Crestor 5 mg nightly. 4.  Dyazide. SOCIAL HISTORY:  The patient lives at home with family. FAMILY HISTORY:  Reviewed and noncontributory. REVIEW OF SYSTEMS:  All systems reviewed and unremarkable. PHYSICAL EXAMINATION:  GENERAL:  The patient is a middle-aged lady, in no respiratory distress. VITAL SIGNS:  Temperature is 98.2, pulse 60, blood pressure is 159/85, oxygen saturation is 97% on room air. HEENT:  Head is atraumatic. Pupils reactive to light. Oral mucosa is moist.  NECK:  Supple. There is no JVD. LUNGS:  Clear to auscultation bilaterally. EXTREMITIES:  There is no edema. No calf tenderness. NEUROLOGIC:  Nonfocal.    LABORATORY DATA:  White blood cell count 10.8, hemoglobin 16.1, platelet count is 377. Sodium 141, potassium 4.2, CO2 26, glucose 107, BUN 16, creatinine 0.9. LFTs unremarkable. Troponin was 0.08. EKG in the emergency department showed sinus rhythm with sinus arrhythmia. ASSESSMENT:  1.   Recurrent chest pain wiconcerning for unstable angina in the patient with multiple risk factors. 2.  Hypertension. PLAN:  The patient is admitted to the hospital for further management. She will be started on anticoagulation as per Cardiology. She received aspirin earlier. Further testing/management as per Cardiology. DVT prophylaxis with anticoagulation. She is expected to return to home upon discharge.       Angi Perry MD      [id number]  D:  09/01/2019 17:40  T:  09/02/2019 1:07  JOB #:  [id number]  CC:

## 2019-09-03 ENCOUNTER — APPOINTMENT (OUTPATIENT)
Dept: NON INVASIVE DIAGNOSTICS | Age: 64
DRG: 246 | End: 2019-09-03
Attending: INTERNAL MEDICINE
Payer: COMMERCIAL

## 2019-09-03 PROBLEM — I20.8 ANGINA AT REST (HCC): Status: ACTIVE | Noted: 2019-09-03

## 2019-09-03 LAB
ATRIAL RATE: 55 BPM
CALCULATED P AXIS, ECG09: 45 DEGREES
CALCULATED R AXIS, ECG10: -39 DEGREES
CALCULATED T AXIS, ECG11: -38 DEGREES
DIAGNOSIS, 93000: NORMAL
ECHO AO ROOT DIAM: 3.42 CM
ECHO AV PEAK GRADIENT: 7.4 MMHG
ECHO AV PEAK VELOCITY: 136.22 CM/S
ECHO LA MAJOR AXIS: 5.4 CM
ECHO LA TO AORTIC ROOT RATIO: 1.58
ECHO LV E' LATERAL VELOCITY: 4.35 CM/S
ECHO LV E' SEPTAL VELOCITY: 4.13 CM/S
ECHO LV INTERNAL DIMENSION DIASTOLIC: 5.47 CM (ref 3.9–5.3)
ECHO LV INTERNAL DIMENSION SYSTOLIC: 3.94 CM
ECHO LV IVSD: 1.11 CM (ref 0.6–0.9)
ECHO LV MASS 2D: 293.7 G (ref 67–162)
ECHO LV MASS INDEX 2D: 161.7 G/M2 (ref 43–95)
ECHO LV POSTERIOR WALL DIASTOLIC: 1.14 CM (ref 0.6–0.9)
ECHO MV A VELOCITY: 79.29 CM/S
ECHO MV AREA PHT: 1.9 CM2
ECHO MV E DECELERATION TIME (DT): 406 MS
ECHO MV E VELOCITY: 47.89 CM/S
ECHO MV E/A RATIO: 0.6
ECHO MV E/E' LATERAL: 11.01
ECHO MV E/E' RATIO (AVERAGED): 11.3
ECHO MV E/E' SEPTAL: 11.6
ECHO MV PRESSURE HALF TIME (PHT): 117.7 MS
ECHO PV MAX VELOCITY: 89.18 CM/S
ECHO PV PEAK GRADIENT: 3.2 MMHG
ECHO RV INTERNAL DIMENSION: 3.86 CM
P-R INTERVAL, ECG05: 176 MS
Q-T INTERVAL, ECG07: 482 MS
QRS DURATION, ECG06: 86 MS
QTC CALCULATION (BEZET), ECG08: 461 MS
VENTRICULAR RATE, ECG03: 55 BPM

## 2019-09-03 PROCEDURE — 93454 CORONARY ARTERY ANGIO S&I: CPT | Performed by: INTERNAL MEDICINE

## 2019-09-03 PROCEDURE — C1753 CATH, INTRAVAS ULTRASOUND: HCPCS | Performed by: INTERNAL MEDICINE

## 2019-09-03 PROCEDURE — 99218 HC RM OBSERVATION: CPT

## 2019-09-03 PROCEDURE — C1769 GUIDE WIRE: HCPCS | Performed by: INTERNAL MEDICINE

## 2019-09-03 PROCEDURE — 77030013744: Performed by: INTERNAL MEDICINE

## 2019-09-03 PROCEDURE — 027035Z DILATION OF CORONARY ARTERY, ONE ARTERY WITH TWO DRUG-ELUTING INTRALUMINAL DEVICES, PERCUTANEOUS APPROACH: ICD-10-PCS | Performed by: INTERNAL MEDICINE

## 2019-09-03 PROCEDURE — 92978 ENDOLUMINL IVUS OCT C 1ST: CPT | Performed by: INTERNAL MEDICINE

## 2019-09-03 PROCEDURE — 77030015766: Performed by: INTERNAL MEDICINE

## 2019-09-03 PROCEDURE — 74011250636 HC RX REV CODE- 250/636

## 2019-09-03 PROCEDURE — 74011250636 HC RX REV CODE- 250/636: Performed by: INTERNAL MEDICINE

## 2019-09-03 PROCEDURE — 74011000250 HC RX REV CODE- 250: Performed by: INTERNAL MEDICINE

## 2019-09-03 PROCEDURE — 74011250637 HC RX REV CODE- 250/637: Performed by: INTERNAL MEDICINE

## 2019-09-03 PROCEDURE — 92928 PRQ TCAT PLMT NTRAC ST 1 LES: CPT | Performed by: INTERNAL MEDICINE

## 2019-09-03 PROCEDURE — 77030013715 HC INFL SYS MRTM -B: Performed by: INTERNAL MEDICINE

## 2019-09-03 PROCEDURE — C1725 CATH, TRANSLUMIN NON-LASER: HCPCS | Performed by: INTERNAL MEDICINE

## 2019-09-03 PROCEDURE — 65660000000 HC RM CCU STEPDOWN

## 2019-09-03 PROCEDURE — 99152 MOD SED SAME PHYS/QHP 5/>YRS: CPT | Performed by: INTERNAL MEDICINE

## 2019-09-03 PROCEDURE — 4A023N7 MEASUREMENT OF CARDIAC SAMPLING AND PRESSURE, LEFT HEART, PERCUTANEOUS APPROACH: ICD-10-PCS | Performed by: INTERNAL MEDICINE

## 2019-09-03 PROCEDURE — C1894 INTRO/SHEATH, NON-LASER: HCPCS | Performed by: INTERNAL MEDICINE

## 2019-09-03 PROCEDURE — 93306 TTE W/DOPPLER COMPLETE: CPT

## 2019-09-03 PROCEDURE — 74011636320 HC RX REV CODE- 636/320: Performed by: INTERNAL MEDICINE

## 2019-09-03 PROCEDURE — 77030019569 HC BND COMPR RAD TERU -B: Performed by: INTERNAL MEDICINE

## 2019-09-03 PROCEDURE — 93041 RHYTHM ECG TRACING: CPT

## 2019-09-03 PROCEDURE — 77030010221 HC SPLNT WR POS TELE -B: Performed by: INTERNAL MEDICINE

## 2019-09-03 PROCEDURE — 74011000258 HC RX REV CODE- 258: Performed by: INTERNAL MEDICINE

## 2019-09-03 PROCEDURE — B241ZZ3 ULTRASONOGRAPHY OF MULTIPLE CORONARY ARTERIES, INTRAVASCULAR: ICD-10-PCS | Performed by: INTERNAL MEDICINE

## 2019-09-03 PROCEDURE — C1887 CATHETER, GUIDING: HCPCS | Performed by: INTERNAL MEDICINE

## 2019-09-03 PROCEDURE — C1874 STENT, COATED/COV W/DEL SYS: HCPCS | Performed by: INTERNAL MEDICINE

## 2019-09-03 PROCEDURE — 99153 MOD SED SAME PHYS/QHP EA: CPT | Performed by: INTERNAL MEDICINE

## 2019-09-03 DEVICE — STENT RONYX40015UX RESOLUTE ONYX 4.00X15
Type: IMPLANTABLE DEVICE | Status: FUNCTIONAL
Brand: RESOLUTE ONYX™

## 2019-09-03 DEVICE — STENT RONYX45015UX RESOLUTE ONYX 4.50X15
Type: IMPLANTABLE DEVICE | Status: FUNCTIONAL
Brand: RESOLUTE ONYX™

## 2019-09-03 RX ORDER — LIDOCAINE HYDROCHLORIDE 10 MG/ML
INJECTION INFILTRATION; PERINEURAL AS NEEDED
Status: DISCONTINUED | OUTPATIENT
Start: 2019-09-03 | End: 2019-09-03 | Stop reason: HOSPADM

## 2019-09-03 RX ORDER — SODIUM CHLORIDE 0.9 % (FLUSH) 0.9 %
5-40 SYRINGE (ML) INJECTION AS NEEDED
Status: DISCONTINUED | OUTPATIENT
Start: 2019-09-03 | End: 2019-09-05 | Stop reason: HOSPADM

## 2019-09-03 RX ORDER — HEPARIN SODIUM 1000 [USP'U]/ML
INJECTION, SOLUTION INTRAVENOUS; SUBCUTANEOUS AS NEEDED
Status: DISCONTINUED | OUTPATIENT
Start: 2019-09-03 | End: 2019-09-03 | Stop reason: HOSPADM

## 2019-09-03 RX ORDER — PRASUGREL 10 MG/1
10 TABLET, FILM COATED ORAL DAILY
Status: DISCONTINUED | OUTPATIENT
Start: 2019-09-04 | End: 2019-09-05 | Stop reason: HOSPADM

## 2019-09-03 RX ORDER — FENTANYL CITRATE 50 UG/ML
INJECTION, SOLUTION INTRAMUSCULAR; INTRAVENOUS AS NEEDED
Status: DISCONTINUED | OUTPATIENT
Start: 2019-09-03 | End: 2019-09-03 | Stop reason: HOSPADM

## 2019-09-03 RX ORDER — MIDAZOLAM HYDROCHLORIDE 1 MG/ML
INJECTION, SOLUTION INTRAMUSCULAR; INTRAVENOUS AS NEEDED
Status: DISCONTINUED | OUTPATIENT
Start: 2019-09-03 | End: 2019-09-03 | Stop reason: HOSPADM

## 2019-09-03 RX ORDER — SODIUM CHLORIDE 0.9 % (FLUSH) 0.9 %
5-40 SYRINGE (ML) INJECTION EVERY 8 HOURS
Status: DISCONTINUED | OUTPATIENT
Start: 2019-09-03 | End: 2019-09-05 | Stop reason: HOSPADM

## 2019-09-03 RX ORDER — PRASUGREL 10 MG/1
TABLET, FILM COATED ORAL AS NEEDED
Status: DISCONTINUED | OUTPATIENT
Start: 2019-09-03 | End: 2019-09-03 | Stop reason: HOSPADM

## 2019-09-03 RX ORDER — VERAPAMIL HYDROCHLORIDE 2.5 MG/ML
INJECTION, SOLUTION INTRAVENOUS AS NEEDED
Status: DISCONTINUED | OUTPATIENT
Start: 2019-09-03 | End: 2019-09-03 | Stop reason: HOSPADM

## 2019-09-03 RX ORDER — HEPARIN SODIUM 200 [USP'U]/100ML
INJECTION, SOLUTION INTRAVENOUS
Status: COMPLETED | OUTPATIENT
Start: 2019-09-03 | End: 2019-09-03

## 2019-09-03 RX ORDER — DIPHENHYDRAMINE HYDROCHLORIDE 50 MG/ML
INJECTION, SOLUTION INTRAMUSCULAR; INTRAVENOUS AS NEEDED
Status: DISCONTINUED | OUTPATIENT
Start: 2019-09-03 | End: 2019-09-03 | Stop reason: HOSPADM

## 2019-09-03 RX ADMIN — ACETAMINOPHEN 650 MG: 325 TABLET, FILM COATED ORAL at 11:55

## 2019-09-03 RX ADMIN — Medication 10 ML: at 15:45

## 2019-09-03 RX ADMIN — Medication 10 ML: at 21:17

## 2019-09-03 RX ADMIN — Medication 10 ML: at 06:47

## 2019-09-03 RX ADMIN — ROSUVASTATIN CALCIUM 20 MG: 10 TABLET, COATED ORAL at 21:17

## 2019-09-03 RX ADMIN — CARVEDILOL 3.12 MG: 3.12 TABLET, FILM COATED ORAL at 10:18

## 2019-09-03 RX ADMIN — CARVEDILOL 3.12 MG: 3.12 TABLET, FILM COATED ORAL at 17:31

## 2019-09-03 RX ADMIN — LOSARTAN POTASSIUM 50 MG: 50 TABLET ORAL at 10:48

## 2019-09-03 RX ADMIN — ASPIRIN 81 MG CHEWABLE TABLET 81 MG: 81 TABLET CHEWABLE at 10:18

## 2019-09-03 NOTE — PROGRESS NOTES
CM updated by bedside RN. Patient to return to CATH lab tomorrow for procedure to LAD. CM will continue to follow. Patient qualifies for [facility] NSTEMI.     Gabrielle Berman MPH

## 2019-09-03 NOTE — PROGRESS NOTES
1930: Bedside and Verbal shift change report given to Kale Reyes RN (oncoming nurse) by GERMAIN Tilley (offgoing nurse). Report included the following information SBAR, Kardex, Intake/Output, MAR, Recent Results and Cardiac Rhythm NSR.     0640: TRANSFER - OUT REPORT:    Verbal report given to Patsy Titus RN(name) on Carlos Whitney  being transferred to [facility](unit) for ordered procedure       Report consisted of patients Situation, Background, Assessment and   Recommendations(SBAR). Information from the following report(s) SBAR, Kardex, Intake/Output, MAR, Recent Results and Cardiac Rhythm NSR/SB was reviewed with the receiving nurse. Lines:   Peripheral IV 09/01/19 Right Antecubital (Active)   Site Assessment Clean, dry, & intact 9/2/2019  7:55 PM   Phlebitis Assessment 0 9/2/2019  7:55 PM   Infiltration Assessment 0 9/2/2019  7:55 PM   Dressing Status Clean, dry, & intact 9/2/2019  7:55 PM   Dressing Type Tape;Transparent 9/2/2019  7:55 PM   Hub Color/Line Status Pink;Capped 9/2/2019  7:55 PM   Action Taken Open ports on tubing capped 9/2/2019  7:55 PM   Alcohol Cap Used Yes 9/2/2019  7:55 PM        Opportunity for questions and clarification was provided. Patient transported with:   Monitor  Registered Nurse    0730: Bedside and Verbal shift change report given to Treva [facility] (oncoming nurse) by Kale Reyes RN (offgoing nurse). Report included the following information SBAR, Kardex, Intake/Output, MAR, Recent Results and Cardiac Rhythm NSR/SB.

## 2019-09-03 NOTE — PROGRESS NOTES
0700 -   Cardiac Cath Lab Procedure Area Arrival Note:    [place] Mask arrived to Cardiac Cath Lab, Procedure Area. Patient identifiers verified with NAME and DATE OF BIRTH. Procedure verified with patient. Consent forms verified. Allergies verified. Patient informed of procedure and plan of care. Questions answered with review. Patient voiced understanding of procedure and plan of care. Patient on cardiac monitor, non-invasive blood pressure, SPO2 monitor. Placed on O2 @ 3 lpm via NC.  IV of NS on pump at 100 ml/hr. Patient status doing well without problems. Patient is A&Ox 4. Patient reports no discomfort at this time. Patient medicated during procedure with orders obtained and verified by Dr. Estrella Maloney. Refer to patients Cardiac Cath Lab PROCEDURE REPORT for vital signs, assessment, status, and response during procedure, printed at end of case. Printed report on chart or scanned into chart. 36 - TRANSFER - OUT REPORT:    Verbal report given to Thad Mancer[address] [facility](name) on Kian Mask  being transferred to (unit) for routine post - op       Report consisted of patients Situation, Background, Assessment and   Recommendations(SBAR). Information from the following report(s) Procedure Summary and MAR was reviewed with the receiving nurse. Lines:   Peripheral IV 09/03/19 Anterior;Right Hand (Active)        Opportunity for questions and clarification was provided.       Patient transported with:   Fidus Writer

## 2019-09-03 NOTE — PROGRESS NOTES
0730: Bedside and Verbal shift change report given to Enrique Pederson, [facility] (oncoming nurse) by Massiel Neal RN (offgoing nurse). Report included the following information SBAR, Kardex, Intake/Output, MAR, Accordion, Recent Results and Cardiac Rhythm NSR. Patient currently in Cath Lab     1015: TRANSFER - IN REPORT:    Verbal report received from Arnold Titus RN(name) on Fatmata Bachelor  being received from [facility](unit) for routine progression of care      Report consisted of patients Situation, Background, Assessment and   Recommendations(SBAR). Information from the following report(s) SBAR, Kardex, Procedure Summary, MAR, Accordion, Recent Results and Cardiac Rhythm NSR was reviewed with the receiving nurse. Opportunity for questions and clarification was provided. Assessment completed upon patients arrival to unit and care assumed. 1040: patient arrived to unit, vitals and assessment completed. TR band clean dry and intact, no bleeding no hematoma    1315: 2cc air removed from TR band, no bleeding no hematoma, dry and intact    1335: 2cc air removed from TR band, no bleeding no hematoma, dry and intact    1350: 2cc air removed from TR band, no bleeding no hematoma, dry and intact    1415: 2cc air removed from TR band, oozing noted 3cc air put back in TR band    1545: 2cc air removed from TR band, no bleeding no hematoma    1715: 2cc air removed from TR band, no bleeding no hematoma    1800: 2 cc air removed from TR band, no bleeding no hematoma. 1900: 1 cc air removed from TR band, no bleeding no hematoma. All air out of TR band but band remains on wrist    1930: Bedside and Verbal shift change report given to Massiel Neal RN (oncoming nurse) by Enrique Pederson RN (offgoing nurse). Report included the following information SBAR, Kardex, Intake/Output, MAR, Accordion, Recent Results and Cardiac Rhythm NSR.

## 2019-09-03 NOTE — PROGRESS NOTES
TRANSFER - IN REPORT:    Verbal report received from [employer] and GERMAIN Moreno on Fatmata Sims  being received from procedure for routine progression of care. Report consisted of patients Situation, Background, Assessment and Recommendations(SBAR). Information from the following report(s) Procedure Summary, MAR and Recent Results was reviewed with the receiving clinician. Opportunity for questions and clarification was provided. Assessment completed upon patients arrival to [address] [place] [address] and care assumed. Cardiac Cath Lab Recovery Arrival Note:    Fatmata Sims arrived to [facility] recovery area. Patient procedure= LHC/stents to RCA. Patient on cardiac monitor, non-invasive blood pressure, SPO2 monitor. On  O2 @ 2 lpm via n/c. IV  of nacl on pump at 100 ml/hr. Angio max infusing Patient status doing well without problems. Patient is A&Ox 4. Patient reports no complaints. PROCEDURE SITE CHECK:    Procedure site:without any bleeding and or hematoma, no pain/discomfort reported at procedure site. No change in patient status. Continue to monitor patient and status.

## 2019-09-03 NOTE — CARDIO/PULMONARY
Cardiac Rehab: CAD education folder given to Dunia Mckeon. Educated using teach back method. Reviewed CAD diagnosis definition and purpose of intervention. Discussed risk factors for CAD to include the following: family history, elevated BMI, hyperlipidemia, hypertension, diabetes, stress, and smoking. Discussed Heart Healthy/Low Sodium (2000 mg) diet. Discussed follow up appointments with cardiologist, signs and symptoms of angina, and what to report to physician after discharge. Emphasized the value of cardiac rehab. Discussed Cardiac Rehab Program format, benefits, and encouraged enrollment to assist with risk modification and management. Dunia Mckeon is for another intervention on her LAD on 9/4  So will schedule closer to discharge. Dunia Mckeon verbalized understanding with questions answered.  Chloe Blum RN

## 2019-09-03 NOTE — PROGRESS NOTES
Hospitalist Progress Note  Shreya Carbajal MD  Answering service: [phone] OR [phone] from in house phone        Date of Service:  9/3/2019  NAME:  Jonas Jackson  :  1955  MRN:  [id number]      Admission Summary:   The patient is a 21-year-old lady with a history of hypertension, who presented to [facility] earlier today, complaining of recurrent chest pain. She noted chest pain, described as tightness in the middle of her chest that is worse with exertion. It did not radiate and this pain was associated with diaphoresis and some difficulty breathing. She was given aspirin and was referred to the emergency department. She is now chest pain free, at rest, has no other complaints    Interval history / Subjective:   Doing OK , [facility] planned for today   Will f/u with cardiology    Assessment & Plan:      1. Unstable angina with NSTEMI - troponin peaked , on full a/c with lovenox cardio following  [facility] planned     2. HTN- on BB + ACE  3. Hyperlipidemia- on statin  4. Tobacco use- - counseled cessation  5. Echocardiogram for EF assessment    Code status: Full  DVT prophylaxis: On lovenox    Care Plan discussed with: Patient/Family and Nurse  Disposition: TBD     Hospital Problems  Never Reviewed          Codes Class Noted POA    Angina at rest [facility]) ICD-10-CM: I20.8  ICD-9-CM: 413.9  9/3/2019 Unknown        Chest pain ICD-10-CM: R07.9  ICD-9-CM: 786.50  2019 Unknown                Review of Systems:   A comprehensive review of systems was negative. Vital Signs:    Last 24hrs VS reviewed since prior progress note.  Most recent are:  Visit Vitals  BP (!) 180/96 (BP 1 Location: Left arm, BP Patient Position: At rest;Supine)   Pulse (!) 57   Temp 97.9 °F (36.6 °C)   Resp 17   Ht 5' 2\" (1.575 m)   Wt 80.4 kg (177 lb 4 oz)   SpO2 94%   BMI 32.42 kg/m²         Intake/Output Summary (Last 24 hours) at 9/3/2019 9941  Last data filed at 9/3/2019 0331  Gross per 24 hour   Intake 480 ml   Output    Net 480 ml        Physical Examination:             Constitutional:  No acute distress, cooperative, pleasant    ENT:  Oral mucous moist, oropharynx benign. Neck supple,    Resp:  CTA bilaterally. No wheezing/rhonchi/rales. No accessory muscle use   CV:  Regular rhythm, normal rate, no murmurs, gallops, rubs    GI:  Soft, non distended, non tender. normoactive bowel sounds, no hepatosplenomegaly     Musculoskeletal:  No edema, warm, 2+ pulses throughout    Neurologic:  Moves all extremities. AAOx3, CN II-XII reviewed     Psych:  Good insight, Not anxious nor agitated. Data Review:    I personally reviewed  Image and labs      Labs:     Recent Labs     09/01/19  1259   WBC 10.8   HGB 16.1*   HCT 48.6*        Recent Labs     09/01/19  1259      K 4.2      CO2 26   BUN 16   CREA 0.92   *   CA 9.7     Recent Labs     09/01/19  1259   SGOT 16   ALT 22   *   TBILI 0.5   TP 7.5   ALB 4.0   GLOB 3.5     No results for input(s): INR, PTP, APTT in the last 72 hours. No lab exists for component: INREXT, INREXT   No results for input(s): FE, TIBC, PSAT, FERR in the last 72 hours. No results found for: FOL, RBCF   No results for input(s): PH, PCO2, PO2 in the last 72 hours.   Recent Labs     09/01/19  2334 09/01/19  1749 09/01/19  1259   TROIQ 0.13* 0.18* 0.08*     Lab Results   Component Value Date/Time    Cholesterol, total 196 09/02/2019 05:12 AM    HDL Cholesterol 25 09/02/2019 05:12 AM    LDL, calculated 121.4 (H) 09/02/2019 05:12 AM    Triglyceride 248 (H) 09/02/2019 05:12 AM    CHOL/HDL Ratio 7.8 (H) 09/02/2019 05:12 AM     No results found for: GLUCPOC  No results found for: COLOR, APPRN, SPGRU, REFSG, JEFF, PROTU, GLUCU, KETU, BILU, UROU, LEONARD, LEUKU, GLUKE, EPSU, BACTU, WBCU, RBCU, CASTS, UCRY      Medications Reviewed:     Current Facility-Administered Medications   Medication Dose Route Frequency    nicotine (NICODERM CQ) 14 mg/24 hr patch 1 Patch  1 Patch TransDERmal Q24H    sodium chloride (NS) flush 5-40 mL  5-40 mL IntraVENous Q8H    sodium chloride (NS) flush 5-40 mL  5-40 mL IntraVENous PRN    acetaminophen (TYLENOL) tablet 650 mg  650 mg Oral Q4H PRN    aspirin chewable tablet 81 mg  81 mg Oral DAILY    carvedilol (COREG) tablet 3.125 mg  3.125 mg Oral BID WITH MEALS    rosuvastatin (CRESTOR) tablet 20 mg  20 mg Oral QHS    losartan (COZAAR) tablet 50 mg  50 mg Oral DAILY WITH BREAKFAST     ______________________________________________________________________  EXPECTED LENGTH OF STAY: - - -  ACTUAL LENGTH OF STAY:          0                 Makeda Kumar MD

## 2019-09-03 NOTE — PROGRESS NOTES
12 lead EKG completed    1010   TRANSFER - OUT REPORT:    Verbal report given to Treva RN(name) on Iraj Perdomombard  being transferred to CVSU(unit) for routine progression of care       Report consisted of patients Situation, Background, Assessment and   Recommendations(SBAR). Information from the following report(s) Procedure Summary, Accordion, Recent Results, Med Rec Status and Cardiac Rhythm SB was reviewed with the receiving nurse. Lines:   Peripheral IV 09/03/19 Anterior;Right Hand (Active)        Opportunity for questions and clarification was provided.       Patient transported with:   Monitor  Registered Nurse

## 2019-09-03 NOTE — PHYSICIAN ADVISORY
Letter of Status Determination:   Recommend hospitalization status upgraded from   OBSERVATION  to INPATIENT  Status     Pt Name:  Chandler Lombard   [facility]   [facility] [id number] /  [id number]  Payor: Christianne Jimenez / Plan: Souleymane Lam / Product Type: HMO /    CSN#  [id number]   Room and Hospital  CCL/PL  @ [facility]   Hospitalization date  9/1/2019  1:31 PM   Current Attending Physician  Jeancarlos Gagnon MD   Principal diagnosis  NSTEMI   Clinicals    The patient is a 52-year-old lady with a history of hypertension, who presented to [facility] earlier today, complaining of recurrent chest pain. She noted chest pain, described as tightness in the middle of her chest that is worse with exertion. It did not radiate and this pain was associated with diaphoresis and some difficulty breathing. She was given aspirin and was referred to the emergency department.  .Pt with NSTEMI/UA, trop peaked at 0.18, for [facility] today,    Milliman (MCG) criteria       STATUS DETERMINATION  INPATIENT    The final decision of the patient's hospitalization status depends on the attending physician's judgment    Additional comments     Payor: Christianne Jimenez / Plan: Souleymane Lam / Product Type: HMO /         Migel Marcelo MD  Cell: [phone]  Physician Advisor

## 2019-09-04 LAB
ANION GAP SERPL CALC-SCNC: 9 MMOL/L (ref 5–15)
ATRIAL RATE: 50 BPM
BUN SERPL-MCNC: 21 MG/DL (ref 6–20)
BUN/CREAT SERPL: 22 (ref 12–20)
CALCIUM SERPL-MCNC: 8.7 MG/DL (ref 8.5–10.1)
CALCULATED P AXIS, ECG09: 39 DEGREES
CALCULATED R AXIS, ECG10: -39 DEGREES
CALCULATED T AXIS, ECG11: -49 DEGREES
CHLORIDE SERPL-SCNC: 108 MMOL/L (ref 97–108)
CO2 SERPL-SCNC: 23 MMOL/L (ref 21–32)
CREAT SERPL-MCNC: 0.97 MG/DL (ref 0.55–1.02)
DIAGNOSIS, 93000: NORMAL
GLUCOSE SERPL-MCNC: 107 MG/DL (ref 65–100)
P-R INTERVAL, ECG05: 180 MS
POTASSIUM SERPL-SCNC: 4.1 MMOL/L (ref 3.5–5.1)
Q-T INTERVAL, ECG07: 510 MS
QRS DURATION, ECG06: 90 MS
QTC CALCULATION (BEZET), ECG08: 464 MS
SODIUM SERPL-SCNC: 140 MMOL/L (ref 136–145)
VENTRICULAR RATE, ECG03: 50 BPM

## 2019-09-04 PROCEDURE — 74011250636 HC RX REV CODE- 250/636

## 2019-09-04 PROCEDURE — C1724 CATH, TRANS ATHEREC,ROTATION: HCPCS | Performed by: INTERNAL MEDICINE

## 2019-09-04 PROCEDURE — 93005 ELECTROCARDIOGRAM TRACING: CPT

## 2019-09-04 PROCEDURE — 74011250637 HC RX REV CODE- 250/637: Performed by: INTERNAL MEDICINE

## 2019-09-04 PROCEDURE — 74011250636 HC RX REV CODE- 250/636: Performed by: INTERNAL MEDICINE

## 2019-09-04 PROCEDURE — 02C03ZZ EXTIRPATION OF MATTER FROM CORONARY ARTERY, ONE ARTERY, PERCUTANEOUS APPROACH: ICD-10-PCS | Performed by: INTERNAL MEDICINE

## 2019-09-04 PROCEDURE — C1753 CATH, INTRAVAS ULTRASOUND: HCPCS | Performed by: INTERNAL MEDICINE

## 2019-09-04 PROCEDURE — 92928 PRQ TCAT PLMT NTRAC ST 1 LES: CPT | Performed by: INTERNAL MEDICINE

## 2019-09-04 PROCEDURE — C1725 CATH, TRANSLUMIN NON-LASER: HCPCS | Performed by: INTERNAL MEDICINE

## 2019-09-04 PROCEDURE — B2151ZZ FLUOROSCOPY OF LEFT HEART USING LOW OSMOLAR CONTRAST: ICD-10-PCS | Performed by: INTERNAL MEDICINE

## 2019-09-04 PROCEDURE — 77030013715 HC INFL SYS MRTM -B: Performed by: INTERNAL MEDICINE

## 2019-09-04 PROCEDURE — 65660000000 HC RM CCU STEPDOWN

## 2019-09-04 PROCEDURE — B241ZZ3 ULTRASONOGRAPHY OF MULTIPLE CORONARY ARTERIES, INTRAVASCULAR: ICD-10-PCS | Performed by: INTERNAL MEDICINE

## 2019-09-04 PROCEDURE — 36415 COLL VENOUS BLD VENIPUNCTURE: CPT

## 2019-09-04 PROCEDURE — C1769 GUIDE WIRE: HCPCS | Performed by: INTERNAL MEDICINE

## 2019-09-04 PROCEDURE — C1887 CATHETER, GUIDING: HCPCS | Performed by: INTERNAL MEDICINE

## 2019-09-04 PROCEDURE — 99152 MOD SED SAME PHYS/QHP 5/>YRS: CPT | Performed by: INTERNAL MEDICINE

## 2019-09-04 PROCEDURE — 74011000250 HC RX REV CODE- 250: Performed by: INTERNAL MEDICINE

## 2019-09-04 PROCEDURE — 99153 MOD SED SAME PHYS/QHP EA: CPT | Performed by: INTERNAL MEDICINE

## 2019-09-04 PROCEDURE — 4A023N7 MEASUREMENT OF CARDIAC SAMPLING AND PRESSURE, LEFT HEART, PERCUTANEOUS APPROACH: ICD-10-PCS | Performed by: INTERNAL MEDICINE

## 2019-09-04 PROCEDURE — B2111ZZ FLUOROSCOPY OF MULTIPLE CORONARY ARTERIES USING LOW OSMOLAR CONTRAST: ICD-10-PCS | Performed by: INTERNAL MEDICINE

## 2019-09-04 PROCEDURE — 74011000258 HC RX REV CODE- 258: Performed by: INTERNAL MEDICINE

## 2019-09-04 PROCEDURE — 92924 PRQ TRLUML C ATHRC 1 ART&/BR: CPT | Performed by: INTERNAL MEDICINE

## 2019-09-04 PROCEDURE — 74011636320 HC RX REV CODE- 636/320: Performed by: INTERNAL MEDICINE

## 2019-09-04 PROCEDURE — 93454 CORONARY ARTERY ANGIO S&I: CPT | Performed by: INTERNAL MEDICINE

## 2019-09-04 PROCEDURE — C1874 STENT, COATED/COV W/DEL SYS: HCPCS | Performed by: INTERNAL MEDICINE

## 2019-09-04 PROCEDURE — 80048 BASIC METABOLIC PNL TOTAL CA: CPT

## 2019-09-04 PROCEDURE — 027035Z DILATION OF CORONARY ARTERY, ONE ARTERY WITH TWO DRUG-ELUTING INTRALUMINAL DEVICES, PERCUTANEOUS APPROACH: ICD-10-PCS | Performed by: INTERNAL MEDICINE

## 2019-09-04 PROCEDURE — 92978 ENDOLUMINL IVUS OCT C 1ST: CPT | Performed by: INTERNAL MEDICINE

## 2019-09-04 PROCEDURE — 77030012468 HC VLV BLEEDBK CNTRL ABBT -B: Performed by: INTERNAL MEDICINE

## 2019-09-04 DEVICE — STENT RONYX25030W RESOLUTE ONYX 2.50X30
Type: IMPLANTABLE DEVICE | Status: FUNCTIONAL
Brand: RESOLUTE ONYX™

## 2019-09-04 DEVICE — STENT RONYX22512W RESOLUTE ONYX 2.25X12
Type: IMPLANTABLE DEVICE | Status: FUNCTIONAL
Brand: RESOLUTE ONYX™

## 2019-09-04 RX ORDER — MIDAZOLAM HYDROCHLORIDE 1 MG/ML
INJECTION, SOLUTION INTRAMUSCULAR; INTRAVENOUS AS NEEDED
Status: DISCONTINUED | OUTPATIENT
Start: 2019-09-04 | End: 2019-09-04 | Stop reason: HOSPADM

## 2019-09-04 RX ORDER — FENTANYL CITRATE 50 UG/ML
INJECTION, SOLUTION INTRAMUSCULAR; INTRAVENOUS AS NEEDED
Status: DISCONTINUED | OUTPATIENT
Start: 2019-09-04 | End: 2019-09-04 | Stop reason: HOSPADM

## 2019-09-04 RX ORDER — AMLODIPINE BESYLATE 5 MG/1
5 TABLET ORAL DAILY
Status: DISCONTINUED | OUTPATIENT
Start: 2019-09-05 | End: 2019-09-05 | Stop reason: HOSPADM

## 2019-09-04 RX ORDER — SODIUM CHLORIDE 9 MG/ML
3 INJECTION, SOLUTION INTRAVENOUS CONTINUOUS
Status: DISCONTINUED | OUTPATIENT
Start: 2019-09-04 | End: 2019-09-04 | Stop reason: HOSPADM

## 2019-09-04 RX ORDER — SODIUM CHLORIDE 9 MG/ML
1.5 INJECTION, SOLUTION INTRAVENOUS CONTINUOUS
Status: DISCONTINUED | OUTPATIENT
Start: 2019-09-04 | End: 2019-09-04 | Stop reason: HOSPADM

## 2019-09-04 RX ORDER — SODIUM CHLORIDE 0.9 % (FLUSH) 0.9 %
5-40 SYRINGE (ML) INJECTION EVERY 8 HOURS
Status: DISCONTINUED | OUTPATIENT
Start: 2019-09-05 | End: 2019-09-05 | Stop reason: HOSPADM

## 2019-09-04 RX ORDER — SODIUM CHLORIDE 0.9 % (FLUSH) 0.9 %
5-40 SYRINGE (ML) INJECTION AS NEEDED
Status: DISCONTINUED | OUTPATIENT
Start: 2019-09-04 | End: 2019-09-05 | Stop reason: HOSPADM

## 2019-09-04 RX ORDER — LOSARTAN POTASSIUM 50 MG/1
100 TABLET ORAL
Status: DISCONTINUED | OUTPATIENT
Start: 2019-09-05 | End: 2019-09-05 | Stop reason: HOSPADM

## 2019-09-04 RX ORDER — LIDOCAINE HYDROCHLORIDE 10 MG/ML
INJECTION INFILTRATION; PERINEURAL AS NEEDED
Status: DISCONTINUED | OUTPATIENT
Start: 2019-09-04 | End: 2019-09-04 | Stop reason: HOSPADM

## 2019-09-04 RX ADMIN — Medication 10 ML: at 07:32

## 2019-09-04 RX ADMIN — CARVEDILOL 3.12 MG: 3.12 TABLET, FILM COATED ORAL at 08:23

## 2019-09-04 RX ADMIN — Medication 10 ML: at 16:09

## 2019-09-04 RX ADMIN — Medication 10 ML: at 21:01

## 2019-09-04 RX ADMIN — CARVEDILOL 3.12 MG: 3.12 TABLET, FILM COATED ORAL at 17:08

## 2019-09-04 RX ADMIN — Medication 10 ML: at 16:08

## 2019-09-04 RX ADMIN — ASPIRIN 81 MG CHEWABLE TABLET 81 MG: 81 TABLET CHEWABLE at 08:23

## 2019-09-04 RX ADMIN — LOSARTAN POTASSIUM 50 MG: 50 TABLET ORAL at 08:23

## 2019-09-04 RX ADMIN — SODIUM CHLORIDE 3 ML/KG/HR: 900 INJECTION, SOLUTION INTRAVENOUS at 09:33

## 2019-09-04 RX ADMIN — ACETAMINOPHEN 650 MG: 325 TABLET, FILM COATED ORAL at 21:01

## 2019-09-04 RX ADMIN — ROSUVASTATIN CALCIUM 20 MG: 10 TABLET, COATED ORAL at 21:01

## 2019-09-04 RX ADMIN — PRASUGREL HYDROCHLORIDE 10 MG: 10 TABLET, FILM COATED ORAL at 08:23

## 2019-09-04 NOTE — PROGRESS NOTES
Hospitalist Progress Note  Karolyn Chu MD  Answering service: [phone] OR [phone] from in house phone        Date of Service:  2019  NAME:  Alisha North  :  1955  MRN:  [id number]      Admission Summary:   The patient is a 59-year-old lady with a history of hypertension, who presented to [facility] earlier today, complaining of recurrent chest pain. She noted chest pain, described as tightness in the middle of her chest that is worse with exertion. It did not radiate and this pain was associated with diaphoresis and some difficulty breathing. She was given aspirin and was referred to the emergency department. She is now chest pain free, at rest, has no other complaints    Interval history / Subjective:   Doing OK ,        Assessment & Plan:        1. NSTEMI: Cardiac Cath- 2 vessel disease  LAD was found to be 100% prox occluded,  with right to left collaterals. RCA had sequential severe lesions proximally and in the late mid  S/p PCI of the RCA on 9/3. Patient For PCI of the LAD today,      2.  HTN: not well controlled. Currently coreg 3.125 BID and losartan 50 daily. Will increase losartan to 100. Keep coreg the same [address] in the mid 50's    3. Hyperlipidemia: Continue statin      Code status: Full  DVT prophylaxis: On lovenox    Care Plan discussed with: Patient/Family and Nurse  Disposition: TBD     Hospital Problems  Never Reviewed          Codes Class Noted POA    Angina at rest [facility]) ICD-10-CM: I20.8  ICD-9-CM: 413.9  9/3/2019 Unknown        Chest pain ICD-10-CM: R07.9  ICD-9-CM: 786.50  2019 Unknown                Review of Systems:   A comprehensive review of systems was negative. Vital Signs:    Last 24hrs VS reviewed since prior progress note.  Most recent are:  Visit Vitals  /68 (BP 1 Location: Left arm, BP Patient Position: At rest)   Pulse (!) 59   Temp 97.8 °F (36.6 °C)   Resp 20   Ht 5' 2\" (1.575 m)   Wt 80.7 kg (177 lb 14.6 oz)   SpO2 97%   BMI 32.54 kg/m²         Intake/Output Summary (Last 24 hours) at 9/4/2019 1013  Last data filed at 9/4/2019 0405  Gross per 24 hour   Intake 600 ml   Output 0 ml   Net 600 ml        Physical Examination:             Constitutional:  No acute distress, cooperative, pleasant    ENT:  Oral mucous moist, oropharynx benign. Neck supple,    Resp:  CTA bilaterally. No wheezing/rhonchi/rales. No accessory muscle use   CV:  Regular rhythm, normal rate, no murmurs, gallops, rubs    GI:  Soft, non distended, non tender. normoactive bowel sounds, no hepatosplenomegaly     Musculoskeletal:  No edema, warm, 2+ pulses throughout    Neurologic:  Moves all extremities. AAOx3, CN II-XII reviewed     Psych:  Good insight, Not anxious nor agitated. Data Review:    I personally reviewed  Image and labs      Labs:     Recent Labs     09/01/19  1259   WBC 10.8   HGB 16.1*   HCT 48.6*        Recent Labs     09/04/19  0411 09/01/19  1259    141   K 4.1 4.2    108   CO2 23 26   BUN 21* 16   CREA 0.97 0.92   * 107*   CA 8.7 9.7     Recent Labs     09/01/19  1259   SGOT 16   ALT 22   *   TBILI 0.5   TP 7.5   ALB 4.0   GLOB 3.5     No results for input(s): INR, PTP, APTT in the last 72 hours. No lab exists for component: INREXT, INREXT   No results for input(s): FE, TIBC, PSAT, FERR in the last 72 hours. No results found for: FOL, RBCF   No results for input(s): PH, PCO2, PO2 in the last 72 hours.   Recent Labs     09/01/19  2334 09/01/19  1749 09/01/19  1259   TROIQ 0.13* 0.18* 0.08*     Lab Results   Component Value Date/Time    Cholesterol, total 196 09/02/2019 05:12 AM    HDL Cholesterol 25 09/02/2019 05:12 AM    LDL, calculated 121.4 (H) 09/02/2019 05:12 AM    Triglyceride 248 (H) 09/02/2019 05:12 AM    CHOL/HDL Ratio 7.8 (H) 09/02/2019 05:12 AM     No results found for: GLUCPOC  No results found for: COLOR, APPRN, SPGRU, REFSG, JEFF, Catherinealyn Sarahy, KETU, BILU, UROU, LEONARD, LEUKU, GLUKE, EPSU, BACTU, WBCU, RBCU, CASTS, UCRY      Medications Reviewed:     Current Facility-Administered Medications   Medication Dose Route Frequency    0.9% sodium chloride infusion  3 mL/kg/hr IntraVENous CONTINUOUS    0.9% sodium chloride infusion  1.5 mL/kg/hr IntraVENous CONTINUOUS    sodium chloride (NS) flush 5-40 mL  5-40 mL IntraVENous Q8H    sodium chloride (NS) flush 5-40 mL  5-40 mL IntraVENous PRN    prasugrel (EFFIENT) tablet 10 mg  10 mg Oral DAILY    nicotine (NICODERM CQ) 14 mg/24 hr patch 1 Patch  1 Patch TransDERmal Q24H    sodium chloride (NS) flush 5-40 mL  5-40 mL IntraVENous Q8H    sodium chloride (NS) flush 5-40 mL  5-40 mL IntraVENous PRN    acetaminophen (TYLENOL) tablet 650 mg  650 mg Oral Q4H PRN    aspirin chewable tablet 81 mg  81 mg Oral DAILY    carvedilol (COREG) tablet 3.125 mg  3.125 mg Oral BID WITH MEALS    rosuvastatin (CRESTOR) tablet 20 mg  20 mg Oral QHS    losartan (COZAAR) tablet 50 mg  50 mg Oral DAILY WITH BREAKFAST     ______________________________________________________________________  EXPECTED LENGTH OF STAY: - - -  ACTUAL LENGTH OF STAY:          Minh Brown MD

## 2019-09-04 NOTE — PROGRESS NOTES
1930: Bedside and Verbal shift change report given to Tyler Watts RN (oncoming nurse) by GERMAIN Tilley (offgoing nurse). Report included the following information SBAR, Kardex, Intake/Output, MAR, Recent Results and Cardiac Rhythm NSR.   0730: Bedside and Verbal shift change report given to Treva [facility] (oncoming nurse) by Tyler Wtats RN (offgoing nurse). Report included the following information SBAR, Kardex, Intake/Output, MAR, Recent Results and Cardiac Rhythm NSR.

## 2019-09-04 NOTE — PROGRESS NOTES
Cardiac Cath Lab Procedure Area Arrival Note:    Mady Grossman arrived to Cardiac Cath Lab, Procedure Area. Patient identifiers verified with NAME and DATE OF BIRTH. Procedure verified with patient. Consent forms verified. Allergies verified. Patient informed of procedure and plan of care. Questions answered with review. Patient voiced understanding of procedure and plan of care. Patient on cardiac monitor, non-invasive blood pressure, SPO2 monitor. On O2 @ 2 lpm via nasal cannula. IV of normal saline on pump at 242 ml/hr. Patient status doing well without problems. Patient is A&Ox 4. Patient reports no pain. Patient medicated during procedure with orders obtained and verified by Dr. Claudia Moore. Refer to patients Cardiac Cath Lab PROCEDURE REPORT for vital signs, assessment, status, and response during procedure, printed at end of case. Printed report on chart or scanned into chart.

## 2019-09-04 NOTE — PROGRESS NOTES
0730: Bedside and Verbal shift change report [facility] ([facility]) by Brent Fields RN (offgoing nurse). Report included the following information SBAR, Kardex, Intake/Output, MAR, Accordion and Cardiac Rhythm Sinus Cloretta Boot. 0900: TRANSFER - OUT REPORT:    Verbal report given to Eris Ware RN(name) on [facility]  being transferred to [facility]unit) for ordered procedure       Report consisted of patients Situation, Background, Assessment and   Recommendations(SBAR). Information from the following report(s) SBAR, Kardex, Intake/Output, MAR, Accordion, Recent Results and Cardiac Rhythm Sinus Cloretta Boot was reviewed with the receiving nurse. Opportunity for questions and clarification was provided. Patient transported with:   Monitor  Registered Nurse    8971: Cath lab nurses at bedside to transport patient for cardiac cath procedure    1250: TRANSFER - IN REPORT:    Verbal report received from Eris Ware RN(name) on [facility]  being received from [facility]unit) for routine progression of care      Report consisted of patients Situation, Background, Assessment and   Recommendations(SBAR). Information from the following report(s) SBAR, Kardex, Intake/Output, MAR, Accordion and Cardiac Rhythm NSR was reviewed with the receiving nurse. Opportunity for questions and clarification was provided. Assessment completed upon patients arrival to unit and care assumed. 1300: patient arrived to unit, vitals and assessment completed. Groin site clean dry and intact    1800: patient up ambulating in room    1930: Bedside and Verbal shift change report given to Anabella Walker RN (oncoming nurse) by GERMAIN Tilley (offgoing nurse). Report included the following information SBAR, Kardex, Intake/Output, MAR, Accordion, Med Rec Status and Cardiac Rhythm NSR.

## 2019-09-04 NOTE — PROGRESS NOTES
[facility] Cardiology Progress Note    Date of service: 9/4/2019    Subjective:  Ms Sarah Regan underwent PCI of the RCA yesterday  Has done well with no recurrent chest pain. Objective:    Visit Vitals  BP (!) 166/94   Pulse (!) 58   Temp 97.8 °F (36.6 °C)   Resp 18   Ht 5' 2\" (1.575 m)   Wt 80.7 kg (177 lb 14.6 oz)   SpO2 95%   BMI 32.54 kg/m²       Physical Exam   Constitutional: She is oriented to person, place, and time. She appears well-developed and well-nourished. HENT:   Head: Normocephalic and atraumatic. Eyes: Conjunctivae are normal. No scleral icterus. Neck: No JVD present. Cardiovascular: Normal rate, regular rhythm and normal heart sounds. Exam reveals no gallop. No murmur heard. Pulmonary/Chest: Effort normal and breath sounds normal. No stridor. No respiratory distress. She has no wheezes. She has no rales. Abdominal: Soft. She exhibits no distension. Musculoskeletal: She exhibits no edema or deformity. Neurological: She is alert and oriented to person, place, and time. Skin: Skin is warm and dry. Psychiatric: She has a normal mood and affect.  Her behavior is normal.       Data reviewed:  Recent Results (from the past 12 hour(s))   METABOLIC PANEL, BASIC    Collection Time: 09/04/19  4:11 AM   Result Value Ref Range    Sodium 140 136 - 145 mmol/L    Potassium 4.1 3.5 - 5.1 mmol/L    Chloride 108 97 - 108 mmol/L    CO2 23 21 - 32 mmol/L    Anion gap 9 5 - 15 mmol/L    Glucose 107 (H) 65 - 100 mg/dL    BUN 21 (H) 6 - 20 MG/DL    Creatinine 0.97 0.55 - 1.02 MG/DL    BUN/Creatinine ratio 22 (H) 12 - 20      GFR est AA >60 >60 ml/min/1.73m2    GFR est non-AA 58 (L) >60 ml/min/1.73m2    Calcium 8.7 8.5 - 10.1 MG/DL     Lab Results   Component Value Date/Time    Cholesterol, total 196 09/02/2019 05:12 AM    HDL Cholesterol 25 09/02/2019 05:12 AM    LDL, calculated 121.4 (H) 09/02/2019 05:12 AM    VLDL, calculated 49.6 09/02/2019 05:12 AM    Triglyceride 248 (H) 09/02/2019 05:12 AM    CHOL/HDL Ratio 7.8 (H) 09/02/2019 05:12 AM       Assessment:    1. NSTEMI    2. CAD, presenting with ACS  2 vessel disease  LAD was found to be 100% prox occluded,  with right to left collaterals. RCA had sequential severe lesions proximally and in the late mid  S/p PCI of the RCA on 9/3    3. HTN: not well controlled currently  4. Hyperlipidemia  5. Tobacco use    Plan:    Continue ASA, Effient  Continue coreg, statin  For PCI of the LAD today, scheduled for 10:45am    Signed:  Brad Willson MD  Interventional Cardiology  9/4/2019

## 2019-09-04 NOTE — PROGRESS NOTES
TRANSFER - IN REPORT:    Verbal report received from Mary Flores on Alisha North  being received from procedural area for routine progression of care. Report consisted of patients Situation, Background, Assessment and Recommendations(SBAR). Information from the following report(s) Procedure Summary, MAR and Recent Results was reviewed with the receiving clinician. Opportunity for questions and clarification was provided. Assessment completed upon patients arrival to [address] [place] [address] and care assumed. Cardiac Cath Lab Recovery Arrival Note:    Alisha North arrived to [facility] recovery area. Patient procedure= LHC. Patient on cardiac monitor, non-invasive blood pressure, SPO2 monitor. On  or O2 @ 2 lpm via nc. IV  of NS on pump at 121 ml/hr. Patient status doing well without problems. Patient is A&Ox 3. Patient reports no c/o's. PROCEDURE SITE CHECK:    Procedure site:without any bleeding and hematoma, no pain/discomfort reported at procedure site. No change in patient status. Continue to monitor patient and status.

## 2019-09-04 NOTE — PROGRESS NOTES
Problem: Falls - Risk of  Goal: *Absence of Falls  Description  Document Asa Juarez Fall Risk and appropriate interventions in the flowsheet.   Outcome: Progressing Towards Goal  Note:   Fall Risk Interventions:            Medication Interventions: Evaluate medications/consider consulting pharmacy, Patient to call before getting OOB                   Problem: Patient Education: Go to Patient Education Activity  Goal: Patient/Family Education  Outcome: Progressing Towards Goal     Problem: Cath Lab Procedures: Post-Cath Day of Procedure (Initiate SCIP Measures for Post-Op Care)  Goal: Activity/Safety  Outcome: Progressing Towards Goal  Goal: Consults, if ordered  Outcome: Progressing Towards Goal  Goal: Diagnostic Test/Procedures  Outcome: Progressing Towards Goal  Goal: Nutrition/Diet  Outcome: Progressing Towards Goal  Goal: Discharge Planning  Outcome: Progressing Towards Goal  Goal: Medications  Outcome: Progressing Towards Goal  Goal: Respiratory  Outcome: Progressing Towards Goal  Goal: Treatments/Interventions/Procedures  Outcome: Progressing Towards Goal  Goal: Psychosocial  Outcome: Progressing Towards Goal  Goal: *Procedure site is without bleeding and signs of infection six hours post sheath removal  Outcome: Progressing Towards Goal  Goal: *Hemodynamically stable  Outcome: Progressing Towards Goal  Goal: *Optimal pain control at patient's stated goal  Outcome: Progressing Towards Goal

## 2019-09-04 NOTE — PROGRESS NOTES
Cardiac Cath Lab Recovery Arrival Note:      Day Kay arrived to Cardiac Cath Lab, Recovery Area. Staff introduced to patient. Patient identifiers verified with NAME and DATE OF BIRTH. Procedure verified with patient. Consent forms reviewed and signed by patient or authorized representative and verified. Allergies verified. Patient and family oriented to department. Patient and family informed of procedure and plan of care. Questions answered with review. Patient prepped for procedure, per orders from physician, prior to arrival.    Patient on cardiac monitor, non-invasive blood pressure, SPO2 monitor. On RA. Patient is A&Ox 4. Patient reports no c/o's. Patient in stretcher, in low position, with side rails up, call bell within reach, patient instructed to call if assistance as needed. Patient prep in: [address], [place] [address]. Patient family has pager # n/a  Family in: hospital.   Prep by: VINCENT Tucker

## 2019-09-04 NOTE — PROGRESS NOTES
Transfer to [facility] RR from Procedure Area    Verbal report given to Raven Rodriguez on Fatou Hamlin being transferred to Cardiac Cath Lab  for routine progression of care   Patient is post left heart cath with intervention procedure. Patient stable upon transfer to . Report consisted of patients Situation, Background, Assessment and   Recommendations(SBAR). Information from the following report(s) Kardex, Procedure Summary, MAR and Recent Results was reviewed with the receiving nurse. Opportunity for questions and clarification was provided. Patient medicated during procedure with orders obtained and verified by Dr. Frank Garsia. Refer to patient PROCEDURE REPORT for vital signs, assessment, status, and response during procedure.

## 2019-09-04 NOTE — PROGRESS NOTES
Verbal report given to Mike(name) on NAME  being transferred to [facility]SU(unit) for routine progression of care       Report consisted of patients Situation, Background, Assessment and   Recommendations(SBAR). Information from the following report(s) Procedure Summary, MAR and Recent Results was reviewed with the receiving nurse. Lines:       Opportunity for questions and clarification was provided.       Patient transported with:   Monitor  Registered Nurse

## 2019-09-04 NOTE — PROGRESS NOTES
Problem: Falls - Risk of  Goal: *Absence of Falls  Description  Document Glendy Young Fall Risk and appropriate interventions in the flowsheet.   Outcome: Progressing Towards Goal  Note:   Fall Risk Interventions:            Medication Interventions: Evaluate medications/consider consulting pharmacy, Teach patient to arise slowly                   Problem: Patient Education: Go to Patient Education Activity  Goal: Patient/Family Education  Outcome: Progressing Towards Goal     Problem: Patient Education: Go to Patient Education Activity  Goal: Patient/Family Education  Outcome: Progressing Towards Goal     Problem: Cath Lab Procedures: Post-Cath Day of Procedure (Initiate SCIP Measures for Post-Op Care)  Goal: Activity/Safety  Outcome: Progressing Towards Goal  Goal: Consults, if ordered  Outcome: Progressing Towards Goal  Goal: Diagnostic Test/Procedures  Outcome: Progressing Towards Goal  Goal: Nutrition/Diet  Outcome: Progressing Towards Goal  Goal: Discharge Planning  Outcome: Progressing Towards Goal  Goal: Medications  Outcome: Progressing Towards Goal  Goal: Respiratory  Outcome: Progressing Towards Goal  Goal: Treatments/Interventions/Procedures  Outcome: Progressing Towards Goal  Goal: Psychosocial  Outcome: Progressing Towards Goal  Goal: *Procedure site is without bleeding and signs of infection six hours post sheath removal  Outcome: Progressing Towards Goal  Goal: *Hemodynamically stable  Outcome: Progressing Towards Goal  Goal: *Optimal pain control at patient's stated goal  Outcome: Progressing Towards Goal

## 2019-09-04 NOTE — CARDIO/PULMONARY
Cardiac Rehab: CAD education folder given to Kiersten Palm at a prior visit. Revisited to schedule appointment for Cardiac Rehab. Educated using teach back method. Reviewed the importance of medication compliance, the purpose of effient, and potential side effects. Discussed follow up appointments with cardiologist, signs and symptoms of angina, and what to report to physician after discharge. Emphasized the value of cardiac rehab. Discussed Cardiac Rehab Program format, benefits, and encouraged enrollment to assist with risk modification and management. Initial Cardiac Rehab Program intake appointment scheduled for 10/2/2019 and appointment information is on the AVS.    Kiersten Arpita verbalized understanding with questions answered.  Eddie Becerra RN

## 2019-09-05 VITALS
SYSTOLIC BLOOD PRESSURE: 150 MMHG | DIASTOLIC BLOOD PRESSURE: 74 MMHG | WEIGHT: 177.91 LBS | OXYGEN SATURATION: 95 % | RESPIRATION RATE: 18 BRPM | BODY MASS INDEX: 32.74 KG/M2 | TEMPERATURE: 97.8 F | HEIGHT: 62 IN | HEART RATE: 60 BPM

## 2019-09-05 LAB
ANION GAP SERPL CALC-SCNC: 9 MMOL/L (ref 5–15)
BUN SERPL-MCNC: 19 MG/DL (ref 6–20)
BUN/CREAT SERPL: 22 (ref 12–20)
CALCIUM SERPL-MCNC: 8.7 MG/DL (ref 8.5–10.1)
CHLORIDE SERPL-SCNC: 110 MMOL/L (ref 97–108)
CO2 SERPL-SCNC: 22 MMOL/L (ref 21–32)
CREAT SERPL-MCNC: 0.85 MG/DL (ref 0.55–1.02)
ERYTHROCYTE [DISTWIDTH] IN BLOOD BY AUTOMATED COUNT: 13.9 % (ref 11.5–14.5)
GLUCOSE SERPL-MCNC: 100 MG/DL (ref 65–100)
HCT VFR BLD AUTO: 41.6 % (ref 35–47)
HGB BLD-MCNC: 13.9 G/DL (ref 11.5–16)
MCH RBC QN AUTO: 29.5 PG (ref 26–34)
MCHC RBC AUTO-ENTMCNC: 33.4 G/DL (ref 30–36.5)
MCV RBC AUTO: 88.3 FL (ref 80–99)
NRBC # BLD: 0 K/UL (ref 0–0.01)
NRBC BLD-RTO: 0 PER 100 WBC
PLATELET # BLD AUTO: 272 K/UL (ref 150–400)
PMV BLD AUTO: 9.9 FL (ref 8.9–12.9)
POTASSIUM SERPL-SCNC: 4.1 MMOL/L (ref 3.5–5.1)
RBC # BLD AUTO: 4.71 M/UL (ref 3.8–5.2)
SODIUM SERPL-SCNC: 141 MMOL/L (ref 136–145)
WBC # BLD AUTO: 11.4 K/UL (ref 3.6–11)

## 2019-09-05 PROCEDURE — 74011250637 HC RX REV CODE- 250/637: Performed by: INTERNAL MEDICINE

## 2019-09-05 PROCEDURE — 80048 BASIC METABOLIC PNL TOTAL CA: CPT

## 2019-09-05 PROCEDURE — 36415 COLL VENOUS BLD VENIPUNCTURE: CPT

## 2019-09-05 PROCEDURE — 85027 COMPLETE CBC AUTOMATED: CPT

## 2019-09-05 RX ORDER — PRASUGREL 10 MG/1
10 TABLET, FILM COATED ORAL DAILY
Qty: 60 TAB | Refills: 3 | Status: SHIPPED | OUTPATIENT
Start: 2019-09-06 | End: 2020-11-20 | Stop reason: ALTCHOICE

## 2019-09-05 RX ORDER — CARVEDILOL 3.12 MG/1
3.12 TABLET ORAL 2 TIMES DAILY WITH MEALS
Qty: 60 TAB | Refills: 3 | Status: SHIPPED | OUTPATIENT
Start: 2019-09-05 | End: 2021-01-12

## 2019-09-05 RX ORDER — AMLODIPINE BESYLATE 5 MG/1
5 TABLET ORAL DAILY
Qty: 60 TAB | Refills: 3 | Status: SHIPPED | OUTPATIENT
Start: 2019-09-06 | End: 2019-11-27

## 2019-09-05 RX ORDER — GUAIFENESIN 100 MG/5ML
81 LIQUID (ML) ORAL DAILY
Qty: 60 TAB | Refills: 3 | Status: SHIPPED | OUTPATIENT
Start: 2019-09-06

## 2019-09-05 RX ORDER — LOSARTAN POTASSIUM 100 MG/1
100 TABLET ORAL
Qty: 60 TAB | Refills: 3 | Status: SHIPPED | OUTPATIENT
Start: 2019-09-06

## 2019-09-05 RX ORDER — ROSUVASTATIN CALCIUM 20 MG/1
20 TABLET, COATED ORAL
Qty: 60 TAB | Refills: 3 | Status: SHIPPED | OUTPATIENT
Start: 2019-09-05

## 2019-09-05 RX ADMIN — ASPIRIN 81 MG CHEWABLE TABLET 81 MG: 81 TABLET CHEWABLE at 09:07

## 2019-09-05 RX ADMIN — PRASUGREL HYDROCHLORIDE 10 MG: 10 TABLET, FILM COATED ORAL at 09:07

## 2019-09-05 RX ADMIN — LOSARTAN POTASSIUM 100 MG: 50 TABLET ORAL at 09:07

## 2019-09-05 RX ADMIN — CARVEDILOL 3.12 MG: 3.12 TABLET, FILM COATED ORAL at 09:07

## 2019-09-05 RX ADMIN — AMLODIPINE BESYLATE 5 MG: 5 TABLET ORAL at 09:07

## 2019-09-05 NOTE — PROGRESS NOTES
[facility] Cardiology Progress Note    Date of service: 9/5/2019    Subjective:  Ms Romario Cerna underwent successful PCI of the LAD yesterday, KIRILL x 2  No more anginal chest pain  Feels some vague fullness in the chest, but this is much different to what she presented with     Objective:    Visit Vitals  /74 (BP 1 Location: Left arm, BP Patient Position: Sitting)   Pulse 60   Temp 97.8 °F (36.6 °C)   Resp 18   Ht 5' 2\" (1.575 m)   Wt 80.7 kg (177 lb 14.6 oz)   SpO2 95%   BMI 32.54 kg/m²       Physical Exam   Constitutional: She is oriented to person, place, and time. She appears well-developed and well-nourished. HENT:   Head: Normocephalic and atraumatic. Eyes: Conjunctivae are normal. No scleral icterus. Neck: No JVD present. Cardiovascular: Normal rate, regular rhythm and normal heart sounds. Exam reveals no gallop. No murmur heard. Pulmonary/Chest: Effort normal and breath sounds normal. No stridor. No respiratory distress. She has no wheezes. She has no rales. Abdominal: Soft. She exhibits no distension. Musculoskeletal: She exhibits no edema or deformity. Neurological: She is alert and oriented to person, place, and time. Skin: Skin is warm and dry. Psychiatric: She has a normal mood and affect.  Her behavior is normal.       Data reviewed:  Recent Results (from the past 12 hour(s))   CBC W/O DIFF    Collection Time: 09/05/19  3:10 AM   Result Value Ref Range    WBC 11.4 (H) 3.6 - 11.0 K/uL    RBC 4.71 3.80 - 5.20 M/uL    HGB 13.9 11.5 - 16.0 g/dL    HCT 41.6 35.0 - 47.0 %    MCV 88.3 80.0 - 99.0 FL    MCH 29.5 26.0 - 34.0 PG    MCHC 33.4 30.0 - 36.5 g/dL    RDW 13.9 11.5 - 14.5 %    PLATELET 738 618 - 794 K/uL    MPV 9.9 8.9 - 12.9 FL    NRBC 0.0 0  WBC    ABSOLUTE NRBC 0.00 0.00 - 3.36 K/uL   METABOLIC PANEL, BASIC    Collection Time: 09/05/19  3:10 AM   Result Value Ref Range    Sodium 141 136 - 145 mmol/L    Potassium 4.1 3.5 - 5.1 mmol/L    Chloride 110 (H) 97 - 108 mmol/L    CO2 22 21 - 32 mmol/L    Anion gap 9 5 - 15 mmol/L    Glucose 100 65 - 100 mg/dL    BUN 19 6 - 20 MG/DL    Creatinine 0.85 0.55 - 1.02 MG/DL    BUN/Creatinine ratio 22 (H) 12 - 20      GFR est AA >60 >60 ml/min/1.73m2    GFR est non-AA >60 >60 ml/min/1.73m2    Calcium 8.7 8.5 - 10.1 MG/DL     Lab Results   Component Value Date/Time    Cholesterol, total 196 09/02/2019 05:12 AM    HDL Cholesterol 25 09/02/2019 05:12 AM    LDL, calculated 121.4 (H) 09/02/2019 05:12 AM    VLDL, calculated 49.6 09/02/2019 05:12 AM    Triglyceride 248 (H) 09/02/2019 05:12 AM    CHOL/HDL Ratio 7.8 (H) 09/02/2019 05:12 AM       Assessment:    1. NSTEMI    2. CAD, presenting with ACS  Two vessel disease  LAD was found to be 100% prox occluded,  with right to left collaterals. RCA had sequential severe lesions proximally and in the late mid  S/p PCI of the RCA on 9/3  S/p PCI of the LAD on 9/4    3. HTN: control improving  4. Hyperlipidemia  5. Tobacco use    Plan:    Continue ASA, Effient. Anticipated duration of DAPT will be 1 year  Continue coreg, statin, amlodipine  Please ensure she has prescriptions for all of the above    [id number] Courtney Soliman for discharge home today, to f/u with me in 2 weeks    Signed:  Brad Calix MD  Interventional Cardiology  9/5/2019

## 2019-09-05 NOTE — DISCHARGE INSTRUCTIONS
Patient Education        Percutaneous Coronary Intervention: What to Expect at Home  Your Recovery    Percutaneous coronary intervention (PCI) is the name for procedures that are used to open a narrowed or blocked coronary artery. The two most common PCI procedures are coronary angioplasty and coronary stent placement. Your groin or arm may have a bruise and feel sore for a day or two after a percutaneous coronary intervention (PCI). You can do light activities around the house, but nothing strenuous for several days. This care sheet gives you a general idea about how long it will take for you to recover. But each person recovers at a different pace. Follow the steps below to get better as quickly as possible. How can you care for yourself at home? Activity    · If the doctor gave you a sedative:  ? For 24 hours, don't do anything that requires attention to detail, such as going to work, making important decisions, or signing any legal documents. It takes time for the medicine's effects to completely wear off.  ? For your safety, do not drive or operate any machinery that could be dangerous. Wait until the medicine wears off and you can think clearly and react easily.     · Do not do strenuous exercise and do not lift, pull, or push anything heavy until your doctor says it is okay. This may be for a day or two. You can walk around the house and do light activity, such as cooking.     · If the catheter was placed in your groin, try not to walk up stairs for the first couple of days.     · If the catheter was placed in your arm near your wrist, do not bend your wrist deeply for the first couple of days. Be careful using your hand to get into and out of a chair or bed.     · Carry your stent identification card with you at all times.     · If your doctor recommends it, get more exercise. Walking is a good choice. Bit by bit, increase the amount you walk every day.  Try for at least 30 minutes on most days of the week.   Diet    · Drink plenty of fluids to help your body flush out the dye. If you have kidney, heart, or liver disease and have to limit fluids, talk with your doctor before you increase the amount of fluids you drink.     · Keep eating a heart-healthy diet that has lots of fruits, vegetables, and whole grains. If you have not been eating this way, talk to your doctor. You also may want to talk to a dietitian. This expert can help you to learn about healthy foods and plan meals. Medicines    · Your doctor will tell you if and when you can restart your medicines. He or she will also give you instructions about taking any new medicines.     · If you take blood thinners, such as warfarin (Coumadin), clopidogrel (Plavix), or aspirin, be sure to talk to your doctor. He or she will tell you if and when to start taking those medicines again. Make sure that you understand exactly what your doctor wants you to do.     · Your doctor will prescribe blood-thinning medicines. You will likely take aspirin plus another antiplatelet, such as clopidogrel (Plavix). It is very important that you take these medicines exactly as directed. These medicines help keep the coronary artery open and reduce your risk of a heart attack.     · Call your doctor if you think you are having a problem with your medicine.    Care of the catheter site    · For 1 or 2 days, keep a bandage over the spot where the catheter was inserted. The bandage probably will fall off in this time.     · Put ice or a cold pack on the area for 10 to 20 minutes at a time to help with soreness or swelling. Put a thin cloth between the ice and your skin.     · You may shower 24 to 48 hours after the procedure, if your doctor okays it. Pat the incision dry.     · Do not soak the catheter site until it is healed. Don't take a bath for 1 week, or until your doctor tells you it is okay.     · Watch for bleeding from the site.  A small amount of blood (up to the size of a quarter) on the bandage can be normal.     · If you are bleeding, lie down and press on the area for 15 minutes to try to make it stop. If the bleeding does not stop, call your doctor or seek immediate medical care. Follow-up care is a key part of your treatment and safety. Be sure to make and go to all appointments, and call your doctor if you are having problems. It's also a good idea to know your test results and keep a list of the medicines you take. When should you call for help? Call 911 anytime you think you may need emergency care. For example, call if:    · You passed out (lost consciousness).     · You have severe trouble breathing.     · You have sudden chest pain and shortness of breath, or you cough up blood.     · You have symptoms of a heart attack, such as:  ? Chest pain or pressure. ? Sweating. ? Shortness of breath. ? Nausea or vomiting. ? Pain that spreads from the chest to the neck, jaw, or one or both shoulders or arms. ? Dizziness or lightheadedness. ? A fast or uneven pulse. After calling 911, chew 1 adult-strength aspirin. Wait for an ambulance. Do not try to drive yourself.     · You have been diagnosed with angina, and you have angina symptoms that do not go away with rest or are not getting better within 5 minutes after you take one dose of nitroglycerin.    Call your doctor now or seek immediate medical care if:    · You are bleeding from the area where the catheter was put in your artery.     · You have a fast-growing, painful lump at the catheter site.     · You have signs of infection, such as:  ? Increased pain, swelling, warmth, or redness. ? Red streaks leading from the catheter site. ? Pus draining from the catheter site. ? A fever.     · Your leg, arm, or hand is painful, looks blue, or feels cold, numb, or tingly.    Watch closely for changes in your health, and be sure to contact your doctor if you have any problems. Where can you learn more?   Go to http://jori-simeon.info/. Enter G191 in the search box to learn more about \"Percutaneous Coronary Intervention: What to Expect at Home. \"  Current as of: July 22, 2018  Content Version: 12.1  © 4570-9830 Healthwise, [facility]. Care instructions adapted under license by [employer] (which disclaims liability or warranty for this information). If you have questions about a medical condition or this instruction, always ask your healthcare professional. [place] [address] any warranty or liability for your use of this information. Smoking Cessation Program: This is a free, phone/text/email based, smoking cessation program. The program is individualized to meet each patient's needs. To enroll use the link - [employer]. com/quit or text Alistair Naranjo to 249 6354 from any smart phone.

## 2019-09-05 NOTE — PROGRESS NOTES
Hospital follow-up PCP transitional care appointment has been scheduled with Dr. Veto Kelley for Friday, 9/13/19 at 11:45 a.m. Pending patient discharge.   Lucas Spatz, Care Management Specialist.

## 2019-09-05 NOTE — DISCHARGE SUMMARY
Hospitalist Discharge Summary     Patient ID:  Dionisio Stewart  [id number]  59 y.o.  1955  9/1/2019    PCP on record: Ronald Pond MD    Admit date: 9/1/2019  Discharge date and time: 9/5/2019    DISCHARGE DIAGNOSIS:    1. NSTEMI: Cardiac Cath- 2 vessel disease  LAD was found to be 100% prox occluded,  with right to left collaterals. RCA had sequential severe lesions proximally and in the late mid    S/p PCI of the RCA on 9/3. Patient For PCI of the LAD on admission,   S/p successful PCI of the LAD yesterday, KIRILL x 2 yesterday     No more CP, No SOB, patient feeling well    As per Cardiology  recs \"     Continue ASA, Effient. Anticipated duration of DAPT will be 1 year  Continue coreg, statin, amlodipine  Please ensure she has prescriptions for all of the above     Ok for discharge home today, to f/u with me in 2 weeks \" Dr Danna Childers       2.  HTN: Better controlled, continue current meds     3. Hyperlipidemia: Continue statin      CONSULTATIONS:  IP CONSULT TO CARDIOLOGY    Excerpted HPI from H&P of Siva Best MD:    The patient is a 60-year-old lady with a history of hypertension, who presented to [facility] earlier today, complaining of recurrent chest pain.  She noted chest pain, described as tightness in the middle of her chest that is worse with exertion.  It did not radiate and this pain was associated with diaphoresis and some difficulty breathing.  She was given aspirin and was referred to the emergency department. Kvng Garcia is now chest pain free, at rest, has no other complaints       ______________________________________________________________________  DISCHARGE SUMMARY/HOSPITAL COURSE:  for full details see H&P, daily progress notes, labs, consult notes. See d/c DX above    _______________________________________________________________________  Patient seen and examined by me on discharge day.   Pertinent Findings:  Gen:    Not in distress  Chest: Clear lungs  CVS: Regular rhythm. No edema  Abd:  Soft, not distended, not tender  Neuro:  Alert, oriented X 3  _______________________________________________________________________  DISCHARGE MEDICATIONS:   Current Discharge Medication List      START taking these medications    Details   amLODIPine (NORVASC) 5 mg tablet Take 1 Tab by mouth daily. Qty: 60 Tab, Refills: 3      aspirin 81 mg chewable tablet Take 1 Tab by mouth daily. Qty: 60 Tab, Refills: 3      carvedilol (COREG) 3.125 mg tablet Take 1 Tab by mouth two (2) times daily (with meals). Qty: 60 Tab, Refills: 3      losartan (COZAAR) 100 mg tablet Take 1 Tab by mouth daily (with breakfast). Qty: 60 Tab, Refills: 3      prasugrel (EFFIENT) 10 mg tablet Take 1 Tab by mouth daily. Qty: 60 Tab, Refills: 3         CONTINUE these medications which have CHANGED    Details   rosuvastatin (CRESTOR) 20 mg tablet Take 1 Tab by mouth nightly. Qty: 60 Tab, Refills: 3         CONTINUE these medications which have NOT CHANGED    Details   triamterene-hydroCHLOROthiazide (DYAZIDE) 37.5-25 mg per capsule Take 1 Cap by mouth daily. sour cherry extract (TART CHERRY EXTRACT) 1,000 mg cap Take 2 Caps by mouth two (2) times a day. Takes 4 capsules twice daily for active gout flares   Indications: gout      vitamin C-multivitamin-mineral (EMERGEN-C) 500 mg chew Take 3 Tabs by mouth two (2) times a day. STOP taking these medications       dilTIAZem CD (CARDIZEM CD) 120 mg ER capsule Comments:   Reason for Stopping:         olmesartan (BENICAR) 20 mg tablet Comments:   Reason for Stopping:                 Patient Follow Up Instructions: Activity: Ad libid  Diet: Cardiac  Wound Care: N/A    Follow-up with cardiology Dr Morena Calix in 2 weeks  Follow-up tests/labs   Follow-up Information     Follow up With Specialties Details Why Contact Info Additional Information    [address] Cardiac Rehabilitation  appt.  is scheduled for 10/2/2019 at 9:30 am. arrive about 15 minutes prior to time. wear comfortable clothes. bring medication list and insurance cards. this is a 2 hour time [address]  [place] [id number]  [phone] [address], [address]. Please arrive 15 minutes prior to your appointment time and you will register in the [facility] [address], [address], on the first floor of the [address]. Telephone: [phone] Fax: [phone] Driving directions To [facility]. Building: Driving WEST on [address], take exit [address] to [employer]. Turn left onto [facility] [address], then turn right into [address] Driving EAST on [address], take exit [address] [facility]. Turn right at the end of the exit ramp. Turn left onto [facility] [address], then turn right into [employer] lot.     Yung Arnett MD Cardiology Schedule an appointment as soon as possible for a visit in 2 weeks  [address] [facility]  14 [facility] [address]       [facility], [facility] Internal Medicine   [place]  [address]  [phone]           ________________________________________________________________    Risk of deterioration: Moderate    Condition at Discharge:  Stable  __________________________________________________________________    Disposition  Home    ____________________________________________________________________    Code Status: full  ___________________________________________________________________      Total time in minutes spent coordinating this discharge (includes going over instructions, follow-up, prescriptions, and preparing report for sign off to her PCP) :  35 minutes    Signed:  Sarah Ortiz MD

## 2019-09-05 NOTE — CARDIO/PULMONARY
Cardiac Rehab; Brief visit to follow up with Yenny Davalos to make her aware of the date for her Cardiac Rehab intake appointment. The information was placed on her AVS. Yenny Davalos is without questions at this time. Tiffany Zavala RN

## 2019-09-27 ENCOUNTER — HOSPITAL ENCOUNTER (OUTPATIENT)
Dept: ULTRASOUND IMAGING | Age: 64
Discharge: HOME | End: 2019-09-27
Attending: INTERNAL MEDICINE
Payer: COMMERCIAL

## 2019-09-27 DIAGNOSIS — N17.9 ACUTE KIDNEY FAILURE, UNSPECIFIED (HCC): ICD-10-CM

## 2019-09-27 PROCEDURE — 76770 US EXAM ABDO BACK WALL COMP: CPT

## 2019-10-02 ENCOUNTER — HOSPITAL ENCOUNTER (OUTPATIENT)
Dept: CARDIAC REHAB | Age: 64
Discharge: HOME | End: 2019-10-02
Payer: COMMERCIAL

## 2019-10-02 VITALS — BODY MASS INDEX: 32.83 KG/M2 | HEIGHT: 62 IN | WEIGHT: 178.4 LBS

## 2019-10-02 PROCEDURE — 93798 PHYS/QHP OP CAR RHAB W/ECG: CPT

## 2019-10-02 NOTE — CARDIO/PULMONARY
Tere Cortes  59 y.o. presented to cardiac wellness for orientation and exercise tolerance test today with a primary diagnosis of NSTEMI and stents on 09/03/19. Her EF is 61 to 65%. Abel Cerda has no previous cardiac history. Cardiac risk factors include smoking/ tobacco exposure, family history, dyslipidemia, obesity, hypertension, stress, inactivity and these were reviewed with Shakila Garrison. She just quit smoking after 30 years on 09/01/19 and is doing well with cessation. Abel Cerda lives with with her  and they are caretakers of his mentally challenged adult sister. They have 3 grown children and 6 grandchildren. PHQ9, depression score, is 3 and this is considered normal.  Patient denied chest pain or SOB during 6 minute walk and was in SB/SR with PACs. Abel Cerda will attend a 60 minute class once a week and exercise 2 days a week in cardiac wellness. Education manual given and reviewed briefly. She is particularly interested in nutrition support as her cardiologist has encouraged her to follow a vegan diet.     Jonathan Hua RN  10/2/2019

## 2019-10-09 ENCOUNTER — HOSPITAL ENCOUNTER (OUTPATIENT)
Dept: CARDIAC REHAB | Age: 64
Discharge: HOME | End: 2019-10-09
Payer: COMMERCIAL

## 2019-10-09 VITALS — WEIGHT: 178 LBS | BODY MASS INDEX: 32.56 KG/M2

## 2019-10-09 PROCEDURE — 93798 PHYS/QHP OP CAR RHAB W/ECG: CPT

## 2019-10-09 PROCEDURE — 93797 PHYS/QHP OP CAR RHAB WO ECG: CPT | Performed by: DIETITIAN, REGISTERED

## 2019-10-09 NOTE — PROGRESS NOTES
Cardiac Rehab Nutrition Assessment - 1:1 Evaluation           NAME: Sheeba Hargrove : 1955 AGE: 59 y.o. GENDER: female  CARDIAC REHAB ADMITTING DIAGNOSIS: MI and stents    Relevant Comorbidites: hypertension, dyslipidemia, renal disease (CKD-3) and cardiovascular disease        LABS:   No results found for: HBA1C, HGBE8, YWG0LBCZ, GHY5WEFS  Lab Results   Component Value Date/Time    Cholesterol, total 196 2019 05:12 AM    HDL Cholesterol 25 2019 05:12 AM    LDL, calculated 121.4 (H) 2019 05:12 AM    VLDL, calculated 49.6 2019 05:12 AM    Triglyceride 248 (H) 2019 05:12 AM    CHOL/HDL Ratio 7.8 (H) 2019 05:12 AM         MEDICATIONS/SUPPLEMENTS:   [unfilled]  Prior to Admission medications    Medication Sig Start Date End Date Taking? Authorizing Provider   rosuvastatin (CRESTOR) 20 mg tablet Take 1 Tab by mouth nightly. 19   Inga [address] [place] [address] Road, MD   amLODIPine (NORVASC) 5 mg tablet Take 1 Tab by mouth daily. 19   Inga [address] [place] [address] Road, MD   aspirin 81 mg chewable tablet Take 1 Tab by mouth daily. 19   Inga [address] [place] [address] Road, MD   carvedilol (COREG) 3.125 mg tablet Take 1 Tab by mouth two (2) times daily (with meals). 19   Inga [address] [place] [address] Road, MD   losartan (COZAAR) 100 mg tablet Take 1 Tab by mouth daily (with breakfast). 19   Inga [address] [place] [address] Road, MD   prasugrel (EFFIENT) 10 mg tablet Take 1 Tab by mouth daily. 19   Chapin Calvo MD   triamterene-hydroCHLOROthiazide (DYAZIDE) 37.5-25 mg per capsule Take 1 Cap by mouth daily. Provider, Historical   sour cherry extract (TART CHERRY EXTRACT) 1,000 mg cap Take 2 Caps by mouth two (2) times a day. Takes 4 capsules twice daily for active gout flares   Indications: gout    Provider, Historical   vitamin C-multivitamin-mineral (EMERGEN-C) 500 mg chew Take 3 Tabs by mouth two (2) times a day.     Provider, Historical           ANTHROPOMETRICS:    Ht Readings from Last 1 Encounters:   10/02/19 5' 2\" (1.575 m)      Wt Readings from Last 1 Encounters:   10/09/19 80.7 kg (178 lb)      IBW:110 # +/- 10%  %IBW: 162 % +/- 10%    BMI: 32.6 kg/M2 Category: obese  Waist: 41 inches    Reported Wt Hx: weight >150 lbs for the past 35 years; has lost 5 lbs in the past 3 weeks from diet changes    Reported Diet Hx:    Rate Your Plate Score: 66  (Score 58-72: Making many healthy choices; 41-57: Some choices need improving 24-40: many choices need improving)     24 Hour Diet Recall  Breakfast Grapefruit, instant flavored oatmeal   Lunch Peanut butter & celery   Dinner Potatoes, squash & zucchini boiled in vegetable broth   Snacks Blueberry yogurt (made from almond milk)   Beverages Coffee with sugar, sweet green tea, cranberry grape juice     Michael Prey Sophia states after her heart attack she was given various diet guidelines. She was told \"heart healthy\" in the hospital, [address] [facility] or DASH by her PCP and fat free vegan with no bread or white flour by her cardiologist.  She has been following the fat free vegan, for the most part, for the past 3 weeks. She expresses difficulty and frustration with the limitations of the diet. She enjoys cooking and is accustomed to making most things from scratch. She is learning to season with things other than butter and salt. However, she does not feel satisfied physically or mentally with her diet. She expresses hesitation about committing to this diet long term. Environmental/Social: , caretaker to her mentally disabled sister- in- law which keeps her \"on her feet\" 15 hours a day; supportive family and active in [employer]; son is a  and she enjoys healthy competition when cooking with him; many of her family's gatherings are around food        NUTRITION INTERVENTION:  Nutrition 60 minute one-on-one education & goal setting with [address] with [facility] relevant labs compared to ideals.     Reviewed weight history and patient's verbalized weight goal as well as any real or perceived barriers to obtaining the goal. Collaborated with patient to set a specific short and long term weight goal.     Reviewed Rate Your Plate and conducted a verbal diet recall. Assessed for environmental, financial, psychosocial, physical and comorbidities that may impact the food and eating patterns / behaviors of Damon 11 with patient to set specific nutrient goals as well as specific food / behavior changes that will help patient meet the overall goal of following a heart healthy eating pattern (using guidelines as set forth by the American Heart Association and modeled after healthful eating patterns as recognized by the USDA Dietary Guidelines such as DASH, Mediterranean or plant-based). Briefly reviewed with Sebastian Moustapha the nutrition information in the Cardiac Rehab patient education book and encouraged Sebastian Gerard to read thoroughly, ask questions as needed, and use for future reference for heart healthy nutrition information. Sebastian Gerard is scheduled to participate in Cardiac Rehab group nutrition classes. PATIENT GOALS:    Weight Goals:  Short Term Weight Goal:170 lbs  Long Term Weight Goal:145-160 lbs    Nutrition Goals:  Daily Recommendations:  Calories: 4902-7126 /day  (using Paulina Hickory with AF 1.3 and deducting 300-500 for weight loss)    Saturated Fat: no more than 8-10 g/day  Trans Fat: 0 g/day  Sodium: no more than 1500 mg/day  Fruit: 1-1.5 cups / day  Vegetables: 1.5-2 or more cups/day    Other:  - read and compare food labels  - follow a mostly whole foods plant based diet; spent significant time discussing meal ideas and also encouraging slow transition to vegan diet and incorporating other vegan protein options such as tofu  -practice the 80/20 rule of moderation      Keeping a food diary was recommended, including caloric beverages.             Questions addressed. Follow-up plans discussed. [facility] verbalized understanding.             Laura Rhodes RD

## 2019-10-10 ENCOUNTER — HOSPITAL ENCOUNTER (OUTPATIENT)
Dept: CARDIAC REHAB | Age: 64
Discharge: HOME | End: 2019-10-10
Payer: COMMERCIAL

## 2019-10-10 VITALS — WEIGHT: 177.2 LBS | BODY MASS INDEX: 32.41 KG/M2

## 2019-10-10 PROCEDURE — 93797 PHYS/QHP OP CAR RHAB WO ECG: CPT | Performed by: DIETITIAN, REGISTERED

## 2019-10-16 ENCOUNTER — HOSPITAL ENCOUNTER (OUTPATIENT)
Dept: CARDIAC REHAB | Age: 64
Discharge: HOME | End: 2019-10-16
Payer: COMMERCIAL

## 2019-10-16 VITALS — WEIGHT: 177 LBS | BODY MASS INDEX: 32.37 KG/M2

## 2019-10-16 PROCEDURE — 93798 PHYS/QHP OP CAR RHAB W/ECG: CPT

## 2019-10-17 ENCOUNTER — HOSPITAL ENCOUNTER (OUTPATIENT)
Dept: CARDIAC REHAB | Age: 64
Discharge: HOME | End: 2019-10-17
Payer: COMMERCIAL

## 2019-10-17 VITALS — WEIGHT: 174.36 LBS | BODY MASS INDEX: 31.89 KG/M2

## 2019-10-17 PROCEDURE — 93798 PHYS/QHP OP CAR RHAB W/ECG: CPT

## 2019-10-17 PROCEDURE — 93797 PHYS/QHP OP CAR RHAB WO ECG: CPT | Performed by: DIETITIAN, REGISTERED

## 2019-10-23 ENCOUNTER — HOSPITAL ENCOUNTER (OUTPATIENT)
Dept: CARDIAC REHAB | Age: 64
Discharge: HOME | End: 2019-10-23
Payer: COMMERCIAL

## 2019-10-23 VITALS — BODY MASS INDEX: 31.79 KG/M2 | WEIGHT: 173.8 LBS

## 2019-10-23 PROCEDURE — 93798 PHYS/QHP OP CAR RHAB W/ECG: CPT

## 2019-10-24 ENCOUNTER — HOSPITAL ENCOUNTER (OUTPATIENT)
Dept: CARDIAC REHAB | Age: 64
Discharge: HOME | End: 2019-10-24
Payer: COMMERCIAL

## 2019-10-24 VITALS — BODY MASS INDEX: 31.84 KG/M2 | WEIGHT: 174.1 LBS

## 2019-10-24 PROCEDURE — 93797 PHYS/QHP OP CAR RHAB WO ECG: CPT | Performed by: DIETITIAN, REGISTERED

## 2019-10-24 PROCEDURE — 93798 PHYS/QHP OP CAR RHAB W/ECG: CPT

## 2019-10-30 ENCOUNTER — HOSPITAL ENCOUNTER (OUTPATIENT)
Dept: CARDIAC REHAB | Age: 64
Discharge: HOME | End: 2019-10-30
Payer: COMMERCIAL

## 2019-10-30 VITALS — WEIGHT: 172.8 LBS | BODY MASS INDEX: 31.61 KG/M2

## 2019-10-30 PROCEDURE — 93798 PHYS/QHP OP CAR RHAB W/ECG: CPT

## 2019-10-31 ENCOUNTER — HOSPITAL ENCOUNTER (OUTPATIENT)
Dept: CARDIAC REHAB | Age: 64
Discharge: HOME | End: 2019-10-31
Payer: COMMERCIAL

## 2019-10-31 VITALS — WEIGHT: 173.9 LBS | BODY MASS INDEX: 31.81 KG/M2

## 2019-10-31 PROCEDURE — 93797 PHYS/QHP OP CAR RHAB WO ECG: CPT

## 2019-11-06 ENCOUNTER — HOSPITAL ENCOUNTER (OUTPATIENT)
Dept: CARDIAC REHAB | Age: 64
Discharge: HOME | End: 2019-11-06
Payer: COMMERCIAL

## 2019-11-06 VITALS — BODY MASS INDEX: 31.79 KG/M2 | WEIGHT: 173.8 LBS

## 2019-11-06 PROCEDURE — 93798 PHYS/QHP OP CAR RHAB W/ECG: CPT

## 2019-11-07 ENCOUNTER — HOSPITAL ENCOUNTER (OUTPATIENT)
Dept: CARDIAC REHAB | Age: 64
Discharge: HOME | End: 2019-11-07
Payer: COMMERCIAL

## 2019-11-07 VITALS — BODY MASS INDEX: 31.5 KG/M2 | WEIGHT: 172.2 LBS

## 2019-11-07 PROCEDURE — 93797 PHYS/QHP OP CAR RHAB WO ECG: CPT

## 2019-11-07 PROCEDURE — 93798 PHYS/QHP OP CAR RHAB W/ECG: CPT

## 2019-11-13 ENCOUNTER — HOSPITAL ENCOUNTER (OUTPATIENT)
Dept: CARDIAC REHAB | Age: 64
Discharge: HOME | End: 2019-11-13
Payer: COMMERCIAL

## 2019-11-13 VITALS — WEIGHT: 172.2 LBS | BODY MASS INDEX: 31.5 KG/M2

## 2019-11-13 PROCEDURE — 93798 PHYS/QHP OP CAR RHAB W/ECG: CPT

## 2019-11-14 ENCOUNTER — HOSPITAL ENCOUNTER (OUTPATIENT)
Dept: CARDIAC REHAB | Age: 64
End: 2019-11-14
Payer: COMMERCIAL

## 2019-11-14 ENCOUNTER — HOSPITAL ENCOUNTER (OUTPATIENT)
Dept: CARDIAC REHAB | Age: 64
Discharge: HOME | End: 2019-11-14
Payer: COMMERCIAL

## 2019-11-14 VITALS — WEIGHT: 172 LBS | BODY MASS INDEX: 31.46 KG/M2

## 2019-11-14 PROCEDURE — 93798 PHYS/QHP OP CAR RHAB W/ECG: CPT

## 2019-11-14 NOTE — CARDIO/PULMONARY
Pt's BP post exercise runs low - today was 82/56, sitting, reported feeling more lightheaded than usual. Unable to stand up from chair without reporting significant dizziness. Pt drank 2 cups of water over 30 minutes - unable to drink faster d/t issue with her esophagus. About 40 minutes after reported symptoms, pt able to stand with minimal dizziness. BP 92/60. Pt was recently taken off norvasc. Call placed to Dr. Lashell Dyson office and LM for Mague Jones RN to call back.

## 2019-11-20 ENCOUNTER — HOSPITAL ENCOUNTER (OUTPATIENT)
Dept: CARDIAC REHAB | Age: 64
Discharge: HOME | End: 2019-11-20
Payer: COMMERCIAL

## 2019-11-20 VITALS — WEIGHT: 170.4 LBS | BODY MASS INDEX: 31.17 KG/M2

## 2019-11-20 PROCEDURE — 93798 PHYS/QHP OP CAR RHAB W/ECG: CPT

## 2019-11-21 ENCOUNTER — APPOINTMENT (OUTPATIENT)
Dept: CARDIAC REHAB | Age: 64
End: 2019-11-21
Payer: COMMERCIAL

## 2019-11-27 ENCOUNTER — HOSPITAL ENCOUNTER (OUTPATIENT)
Dept: CARDIAC REHAB | Age: 64
Discharge: HOME | End: 2019-11-27
Payer: COMMERCIAL

## 2019-11-27 VITALS — WEIGHT: 171.2 LBS | BODY MASS INDEX: 31.31 KG/M2

## 2019-11-27 PROCEDURE — 93798 PHYS/QHP OP CAR RHAB W/ECG: CPT

## 2019-12-04 ENCOUNTER — HOSPITAL ENCOUNTER (OUTPATIENT)
Dept: CARDIAC REHAB | Age: 64
Discharge: HOME | End: 2019-12-04
Payer: COMMERCIAL

## 2019-12-04 VITALS — WEIGHT: 169 LBS | BODY MASS INDEX: 30.91 KG/M2

## 2019-12-04 PROCEDURE — 93798 PHYS/QHP OP CAR RHAB W/ECG: CPT

## 2019-12-05 ENCOUNTER — HOSPITAL ENCOUNTER (OUTPATIENT)
Dept: CARDIAC REHAB | Age: 64
Discharge: HOME | End: 2019-12-05
Payer: COMMERCIAL

## 2019-12-05 VITALS — BODY MASS INDEX: 30.8 KG/M2 | WEIGHT: 168.4 LBS

## 2019-12-05 PROCEDURE — 93797 PHYS/QHP OP CAR RHAB WO ECG: CPT | Performed by: DIETITIAN, REGISTERED

## 2019-12-05 PROCEDURE — 93798 PHYS/QHP OP CAR RHAB W/ECG: CPT

## 2019-12-11 ENCOUNTER — HOSPITAL ENCOUNTER (OUTPATIENT)
Dept: CARDIAC REHAB | Age: 64
Discharge: HOME | End: 2019-12-11
Payer: COMMERCIAL

## 2019-12-11 VITALS — BODY MASS INDEX: 30.65 KG/M2 | WEIGHT: 167.6 LBS

## 2019-12-11 PROCEDURE — 93798 PHYS/QHP OP CAR RHAB W/ECG: CPT

## 2019-12-12 ENCOUNTER — HOSPITAL ENCOUNTER (OUTPATIENT)
Dept: CARDIAC REHAB | Age: 64
Discharge: HOME | End: 2019-12-12
Payer: COMMERCIAL

## 2019-12-12 VITALS — BODY MASS INDEX: 30.73 KG/M2 | WEIGHT: 168 LBS

## 2019-12-12 PROCEDURE — 93798 PHYS/QHP OP CAR RHAB W/ECG: CPT

## 2019-12-12 PROCEDURE — 93797 PHYS/QHP OP CAR RHAB WO ECG: CPT | Performed by: DIETITIAN, REGISTERED

## 2019-12-18 ENCOUNTER — HOSPITAL ENCOUNTER (OUTPATIENT)
Dept: CARDIAC REHAB | Age: 64
Discharge: HOME | End: 2019-12-18
Payer: COMMERCIAL

## 2019-12-18 VITALS — WEIGHT: 168.4 LBS | BODY MASS INDEX: 30.8 KG/M2

## 2019-12-18 PROCEDURE — 93798 PHYS/QHP OP CAR RHAB W/ECG: CPT

## 2019-12-19 ENCOUNTER — HOSPITAL ENCOUNTER (OUTPATIENT)
Dept: CARDIAC REHAB | Age: 64
Discharge: HOME | End: 2019-12-19
Payer: COMMERCIAL

## 2019-12-19 VITALS — BODY MASS INDEX: 30.91 KG/M2 | WEIGHT: 169 LBS

## 2019-12-19 PROCEDURE — 93797 PHYS/QHP OP CAR RHAB WO ECG: CPT | Performed by: DIETITIAN, REGISTERED

## 2019-12-19 PROCEDURE — 93798 PHYS/QHP OP CAR RHAB W/ECG: CPT

## 2019-12-26 ENCOUNTER — HOSPITAL ENCOUNTER (OUTPATIENT)
Dept: CARDIAC REHAB | Age: 64
Discharge: HOME | End: 2019-12-26
Payer: COMMERCIAL

## 2019-12-26 VITALS — BODY MASS INDEX: 31.09 KG/M2 | WEIGHT: 170 LBS

## 2019-12-26 PROCEDURE — 93798 PHYS/QHP OP CAR RHAB W/ECG: CPT

## 2019-12-26 PROCEDURE — 93797 PHYS/QHP OP CAR RHAB WO ECG: CPT

## 2020-01-02 ENCOUNTER — HOSPITAL ENCOUNTER (OUTPATIENT)
Dept: CARDIAC REHAB | Age: 65
Discharge: HOME | End: 2020-01-02
Payer: COMMERCIAL

## 2020-01-02 VITALS — BODY MASS INDEX: 31.13 KG/M2 | WEIGHT: 170.2 LBS

## 2020-01-02 PROCEDURE — 93798 PHYS/QHP OP CAR RHAB W/ECG: CPT

## 2020-01-02 PROCEDURE — 93797 PHYS/QHP OP CAR RHAB WO ECG: CPT | Performed by: DIETITIAN, REGISTERED

## 2020-01-08 ENCOUNTER — HOSPITAL ENCOUNTER (OUTPATIENT)
Dept: CARDIAC REHAB | Age: 65
Discharge: HOME | End: 2020-01-08
Payer: COMMERCIAL

## 2020-01-08 VITALS — WEIGHT: 168.8 LBS | BODY MASS INDEX: 30.87 KG/M2

## 2020-01-08 PROCEDURE — 93798 PHYS/QHP OP CAR RHAB W/ECG: CPT

## 2020-01-09 ENCOUNTER — HOSPITAL ENCOUNTER (OUTPATIENT)
Dept: CARDIAC REHAB | Age: 65
Discharge: HOME | End: 2020-01-09
Payer: COMMERCIAL

## 2020-01-09 VITALS — WEIGHT: 167.8 LBS | BODY MASS INDEX: 30.69 KG/M2

## 2020-01-09 PROCEDURE — 93798 PHYS/QHP OP CAR RHAB W/ECG: CPT

## 2020-02-02 ENCOUNTER — HOSPITAL ENCOUNTER (EMERGENCY)
Age: 65
Discharge: HOME | End: 2020-02-02
Attending: EMERGENCY MEDICINE | Admitting: EMERGENCY MEDICINE
Payer: COMMERCIAL

## 2020-02-02 ENCOUNTER — APPOINTMENT (OUTPATIENT)
Dept: GENERAL RADIOLOGY | Age: 65
End: 2020-02-02
Attending: EMERGENCY MEDICINE
Payer: COMMERCIAL

## 2020-02-02 VITALS
HEIGHT: 62 IN | DIASTOLIC BLOOD PRESSURE: 73 MMHG | RESPIRATION RATE: 21 BRPM | WEIGHT: 166 LBS | HEART RATE: 76 BPM | BODY MASS INDEX: 30.55 KG/M2 | OXYGEN SATURATION: 96 % | SYSTOLIC BLOOD PRESSURE: 162 MMHG | TEMPERATURE: 98.1 F

## 2020-02-02 DIAGNOSIS — R07.9 ACUTE CHEST PAIN: ICD-10-CM

## 2020-02-02 DIAGNOSIS — R00.2 PALPITATIONS: Primary | ICD-10-CM

## 2020-02-02 DIAGNOSIS — I49.1 PAC (PREMATURE ATRIAL CONTRACTION): ICD-10-CM

## 2020-02-02 LAB
ALBUMIN SERPL-MCNC: 3.6 G/DL (ref 3.5–5)
ALBUMIN/GLOB SERPL: 1.1 {RATIO} (ref 1.1–2.2)
ALP SERPL-CCNC: 119 U/L (ref 45–117)
ALT SERPL-CCNC: 14 U/L (ref 12–78)
ANION GAP SERPL CALC-SCNC: 5 MMOL/L (ref 5–15)
AST SERPL-CCNC: 11 U/L (ref 15–37)
BASOPHILS # BLD: 0 K/UL (ref 0–0.1)
BASOPHILS NFR BLD: 0 % (ref 0–1)
BILIRUB SERPL-MCNC: 0.3 MG/DL (ref 0.2–1)
BUN SERPL-MCNC: 17 MG/DL (ref 6–20)
BUN/CREAT SERPL: 17 (ref 12–20)
CALCIUM SERPL-MCNC: 8.9 MG/DL (ref 8.5–10.1)
CHLORIDE SERPL-SCNC: 111 MMOL/L (ref 97–108)
CK MB CFR SERPL CALC: NORMAL % (ref 0–2.5)
CK MB SERPL-MCNC: <1 NG/ML (ref 5–25)
CK SERPL-CCNC: 50 U/L (ref 26–192)
CO2 SERPL-SCNC: 27 MMOL/L (ref 21–32)
COMMENT, HOLDF: NORMAL
CREAT SERPL-MCNC: 0.99 MG/DL (ref 0.55–1.02)
DIFFERENTIAL METHOD BLD: NORMAL
EOSINOPHIL # BLD: 0.2 K/UL (ref 0–0.4)
EOSINOPHIL NFR BLD: 2 % (ref 0–7)
ERYTHROCYTE [DISTWIDTH] IN BLOOD BY AUTOMATED COUNT: 13.5 % (ref 11.5–14.5)
GLOBULIN SER CALC-MCNC: 3.3 G/DL (ref 2–4)
GLUCOSE SERPL-MCNC: 87 MG/DL (ref 65–100)
HCT VFR BLD AUTO: 39.4 % (ref 35–47)
HGB BLD-MCNC: 12.9 G/DL (ref 11.5–16)
IMM GRANULOCYTES # BLD AUTO: 0 K/UL (ref 0–0.04)
IMM GRANULOCYTES NFR BLD AUTO: 0 % (ref 0–0.5)
LYMPHOCYTES # BLD: 2.1 K/UL (ref 0.8–3.5)
LYMPHOCYTES NFR BLD: 23 % (ref 12–49)
MCH RBC QN AUTO: 30.9 PG (ref 26–34)
MCHC RBC AUTO-ENTMCNC: 32.7 G/DL (ref 30–36.5)
MCV RBC AUTO: 94.5 FL (ref 80–99)
MONOCYTES # BLD: 1 K/UL (ref 0–1)
MONOCYTES NFR BLD: 12 % (ref 5–13)
NEUTS SEG # BLD: 5.7 K/UL (ref 1.8–8)
NEUTS SEG NFR BLD: 63 % (ref 32–75)
NRBC # BLD: 0 K/UL (ref 0–0.01)
NRBC BLD-RTO: 0 PER 100 WBC
PLATELET # BLD AUTO: 291 K/UL (ref 150–400)
PMV BLD AUTO: 10 FL (ref 8.9–12.9)
POTASSIUM SERPL-SCNC: 3.8 MMOL/L (ref 3.5–5.1)
PROT SERPL-MCNC: 6.9 G/DL (ref 6.4–8.2)
RBC # BLD AUTO: 4.17 M/UL (ref 3.8–5.2)
SAMPLES BEING HELD,HOLD: NORMAL
SODIUM SERPL-SCNC: 143 MMOL/L (ref 136–145)
TROPONIN I SERPL-MCNC: <0.05 NG/ML
WBC # BLD AUTO: 9.1 K/UL (ref 3.6–11)

## 2020-02-02 PROCEDURE — 82550 ASSAY OF CK (CPK): CPT

## 2020-02-02 PROCEDURE — 71046 X-RAY EXAM CHEST 2 VIEWS: CPT

## 2020-02-02 PROCEDURE — 80053 COMPREHEN METABOLIC PANEL: CPT

## 2020-02-02 PROCEDURE — 84484 ASSAY OF TROPONIN QUANT: CPT

## 2020-02-02 PROCEDURE — 85025 COMPLETE CBC W/AUTO DIFF WBC: CPT

## 2020-02-02 PROCEDURE — 36415 COLL VENOUS BLD VENIPUNCTURE: CPT

## 2020-02-02 PROCEDURE — 99285 EMERGENCY DEPT VISIT HI MDM: CPT

## 2020-02-02 PROCEDURE — 93005 ELECTROCARDIOGRAM TRACING: CPT

## 2020-02-03 LAB
ATRIAL RATE: 70 BPM
CALCULATED P AXIS, ECG09: 35 DEGREES
CALCULATED R AXIS, ECG10: -24 DEGREES
CALCULATED T AXIS, ECG11: 17 DEGREES
DIAGNOSIS, 93000: NORMAL
P-R INTERVAL, ECG05: 166 MS
Q-T INTERVAL, ECG07: 432 MS
QRS DURATION, ECG06: 80 MS
QTC CALCULATION (BEZET), ECG08: 466 MS
VENTRICULAR RATE, ECG03: 70 BPM

## 2020-02-03 NOTE — ED PROVIDER NOTES
HPI .  Patient has a history of coronary artery disease, non-ST elevation myocardial infarction, gout, hypertension, and renal colic. Patient reports 10 days of chest pain. Pain is midsternal.  Initially the pain was lasting anywhere from 2 to 20 minutes. Pain is been constant all day today. A second complaint is fluttering in the chest.  The fluttering in the chest is been constant all day today also. Patient was seen at [facility] urgent care center 7 days ago. EKG was similar to tonight with a lot of PACs. Troponin was normal.  She was told that her chest x-ray was negative.   Patient subsequently saw her cardiologist ;she wore a Holter monitor which was removed 2 days ago    Past Medical History:   Diagnosis Date    CAD (coronary artery disease) 09/03/2019    stent and NSTEMI    Cancer ([facility] [address]     on nose    Gout     Hypertension     Kidney stone        Past Surgical History:   Procedure Laterality Date    HX APPENDECTOMY      HX HEENT      Tonsilectomy    HX HYSTERECTOMY      HX OTHER SURGICAL      surgey on nose for cancer    HX OTHER SURGICAL      bunionectomy         Family History:   Problem Relation Age of Onset    Cancer Mother     Diabetes Mother     Heart Disease Mother     Hypertension Mother     Kidney Disease Mother     Diabetes Father     Kidney Disease Father         had transplant    Heart Disease Father        Social History     Socioeconomic History    Marital status:      Spouse name: Not on file    Number of children: Not on file    Years of education: Not on file    Highest education level: Not on file   Occupational History    Not on file   Social Needs    Financial resource strain: Not on file    Food insecurity:     Worry: Not on file     Inability: Not on file    Transportation needs:     Medical: Not on file     Non-medical: Not on file   Tobacco Use    Smoking status: Former Smoker     Packs/day: 0.50     Years: 30.00     Pack years: 15.00     Last attempt to quit: 2019     Years since quittin.4    Smokeless tobacco: Never Used   Substance and Sexual Activity    Alcohol use: Yes     Comment: occationally    Drug use: Not on file    Sexual activity: Not on file   Lifestyle    Physical activity:     Days per week: Not on file     Minutes per session: Not on file    Stress: Not on file   Relationships    Social connections:     Talks on phone: Not on file     Gets together: Not on file     Attends Restorationism service: Not on file     Active member of club or organization: Not on file     Attends meetings of clubs or organizations: Not on file     Relationship status: Not on file    Intimate partner violence:     Fear of current or ex partner: Not on file     Emotionally abused: Not on file     Physically abused: Not on file     Forced sexual activity: Not on file   Other Topics Concern     Service Not Asked    Blood Transfusions Not Asked    Caffeine Concern Not Asked    Occupational Exposure Not Asked   Larna Bob Hazards Not Asked    Sleep Concern Not Asked    Stress Concern Not Asked    Weight Concern Not Asked    Special Diet Not Asked    Back Care Not Asked    Exercise Not Asked    Bike Helmet Not Asked    [address] Not Asked    Self-Exams Not Asked   Social History Narrative    Not on file         ALLERGIES: Morphine; Pcn [penicillins]; and Renografin-60 [diatrizoate oracio-diatrizoat sod]    Review of Systems   Constitutional: Negative for activity change and appetite change. HENT: Negative for congestion and trouble swallowing. Cardiovascular: Positive for chest pain and palpitations. Gastrointestinal: Negative for abdominal distention and abdominal pain. Genitourinary: Positive for difficulty urinating. Neurological: Negative for speech difficulty and weakness. Psychiatric/Behavioral: Positive for agitation. Negative for behavioral problems and dysphoric mood.        Vitals:    20 1906 20 2042   BP: 160/87 172/82   Pulse: 85 73   Resp: 18    Temp: 98.7 °F (37.1 °C)    SpO2: 97% 97%   Weight: 75.3 kg (166 lb)    Height: 5' 2\" (1.575 m)             Physical Exam  Vitals signs and nursing note reviewed. Constitutional:       Appearance: She is well-developed. HENT:      Head: Normocephalic and atraumatic. Eyes:      Pupils: Pupils are equal, round, and reactive to light. Neck:      Musculoskeletal: Normal range of motion and neck supple. Cardiovascular:      Rate and Rhythm: Normal rate and regular rhythm. Heart sounds: Normal heart sounds. No murmur. No friction rub. No gallop. Pulmonary:      Effort: Pulmonary effort is normal. No respiratory distress. Breath sounds: No wheezing or rales. Abdominal:      Palpations: Abdomen is soft. Tenderness: There is no abdominal tenderness. There is no rebound. Musculoskeletal: Normal range of motion. General: No tenderness. Skin:     Findings: No erythema. Neurological:      Mental Status: She is alert. Cranial Nerves: No cranial nerve deficit. Comments: Motor; symmetric   Psychiatric:         Behavior: Behavior normal.          MDM       Procedures        ED EKG interpretation:  Rhythm: normal sinus rhythm and PAC's; and regular . Rate (approx.): 70; Axis: normal; P wave: normal; QRS interval: normal ; ST/T wave: normal; in  Lead: ; Other findings: . This EKG was interpreted by Mike Abreu MD,ED Provider. Note: Patient's jaw and chest is been hurting constantly all day today. She also has constant fluttering. She has a lot of PACs which probably explains the fluttering. She had a tooth pulled in December and says she had dental work last week and she thinks this may be causing her jaw pain. Troponin is normal despite the prolonged pain today. Patient's cardiology group will be consulted. Mike Abreu MD  9:23 PM      CONSULT NOTE:  Spoke to Dr Leighann Valenzuela concerning the patient.  The patient's history, presentation, physical findings, and results were all discussed.    9:46 PM

## 2020-02-03 NOTE — ED TRIAGE NOTES
Patient arrives with c/o palpitations and heartburn starting last Thursday, went to [facility] last Sunday. Cardiologist is Konrad Pruitt, has 4 stents from Labor Day she had an MI. Was told by Dr Konrad Pruitt to get 48 hour monitor, was turned in yesterday. Now has LEFT jaw pain and LEFT neck burning pain starting last night, intermittently. Cardiology on call recommended ER.   Denies SOB/dizziness, denies n/v

## 2020-02-20 NOTE — CARDIO/PULMONARY
Phillip Bradley Completed phase II cardiac rehab and attended 36 sessions from *10/02/19 - 0109/20. Phillip Bradley is interested in maintaining optimal health and will work with [address] [place] [address],  [address]. Phillip Bradley has improved her endurance and stamina through regular exercise, lost 10 lbs and 2.5 inches from her waist. Blood pressure is 128/74 and is WNL. She has also improved her nutrition, [employer] and depression scores and these were reviewed with patient. Wilda Cortes plans to continue exercising at home, and has set revised goals that include using cardio equipment, light weights and walking, 3 to 5 times a week for at least 30 minutes. Abdulkadir Isaac RN 
2/20/2020

## 2020-07-06 ENCOUNTER — OFFICE VISIT (OUTPATIENT)
Dept: PRIMARY CARE CLINIC | Age: 65
End: 2020-07-06

## 2020-07-06 VITALS — OXYGEN SATURATION: 98 % | TEMPERATURE: 98.5 F | HEART RATE: 60 BPM

## 2020-07-06 DIAGNOSIS — Z01.818 PRE-OP TESTING: Primary | ICD-10-CM

## 2020-07-06 DIAGNOSIS — Z11.59 SPECIAL SCREENING EXAMINATION FOR UNSPECIFIED VIRAL DISEASE: ICD-10-CM

## 2020-07-06 NOTE — PROGRESS NOTES
Patient is being seen at the [address] [place] [address]. Please see scanned documentation as well for further information. S:  Ms. Carlos Chavez presents for pre-op or pre-procedure testing for Covid 19. Patient has procedure or surgery by Dr. Phuong Cabrera scheduled for 7/9/20. Patient denies current Covid type symptoms. O:    Visit Vitals  Pulse 60   Temp 98.5 °F (36.9 °C)   SpO2 98%     Alert and oriented  No acute distress, no increased work of breathing  Normocephalic, atraumatic  Skin color normal  Calm and cooperative  Voice clear, conversant without shortness of breath    A/P:  Pre-op, Pre-procedure exam    Covid 19 testing performed  Patient understands she will be contacted if the results are positive. Follow up prn.

## 2020-07-09 ENCOUNTER — HOSPITAL ENCOUNTER (OUTPATIENT)
Age: 65
Setting detail: OUTPATIENT SURGERY
Discharge: HOME | End: 2020-07-09
Attending: INTERNAL MEDICINE | Admitting: INTERNAL MEDICINE
Payer: COMMERCIAL

## 2020-07-09 VITALS
RESPIRATION RATE: 17 BRPM | HEIGHT: 63 IN | DIASTOLIC BLOOD PRESSURE: 62 MMHG | SYSTOLIC BLOOD PRESSURE: 147 MMHG | WEIGHT: 165 LBS | BODY MASS INDEX: 29.23 KG/M2 | OXYGEN SATURATION: 97 % | TEMPERATURE: 97.9 F | HEART RATE: 54 BPM

## 2020-07-09 DIAGNOSIS — I25.10 CVD (CARDIOVASCULAR DISEASE): ICD-10-CM

## 2020-07-09 PROCEDURE — 74011250636 HC RX REV CODE- 250/636: Performed by: INTERNAL MEDICINE

## 2020-07-09 PROCEDURE — 74011636320 HC RX REV CODE- 636/320: Performed by: INTERNAL MEDICINE

## 2020-07-09 PROCEDURE — 99152 MOD SED SAME PHYS/QHP 5/>YRS: CPT | Performed by: INTERNAL MEDICINE

## 2020-07-09 PROCEDURE — 77030015766: Performed by: INTERNAL MEDICINE

## 2020-07-09 PROCEDURE — 74011000250 HC RX REV CODE- 250: Performed by: INTERNAL MEDICINE

## 2020-07-09 PROCEDURE — 77030013744: Performed by: INTERNAL MEDICINE

## 2020-07-09 PROCEDURE — 77030019569 HC BND COMPR RAD TERU -B: Performed by: INTERNAL MEDICINE

## 2020-07-09 PROCEDURE — C1894 INTRO/SHEATH, NON-LASER: HCPCS | Performed by: INTERNAL MEDICINE

## 2020-07-09 PROCEDURE — 99153 MOD SED SAME PHYS/QHP EA: CPT | Performed by: INTERNAL MEDICINE

## 2020-07-09 PROCEDURE — 77030010221 HC SPLNT WR POS TELE -B: Performed by: INTERNAL MEDICINE

## 2020-07-09 PROCEDURE — C1769 GUIDE WIRE: HCPCS | Performed by: INTERNAL MEDICINE

## 2020-07-09 PROCEDURE — 93454 CORONARY ARTERY ANGIO S&I: CPT | Performed by: INTERNAL MEDICINE

## 2020-07-09 RX ORDER — FENTANYL CITRATE 50 UG/ML
INJECTION, SOLUTION INTRAMUSCULAR; INTRAVENOUS AS NEEDED
Status: DISCONTINUED | OUTPATIENT
Start: 2020-07-09 | End: 2020-07-09 | Stop reason: HOSPADM

## 2020-07-09 RX ORDER — SODIUM CHLORIDE 0.9 % (FLUSH) 0.9 %
5-40 SYRINGE (ML) INJECTION AS NEEDED
Status: DISCONTINUED | OUTPATIENT
Start: 2020-07-09 | End: 2020-07-09 | Stop reason: HOSPADM

## 2020-07-09 RX ORDER — HEPARIN SODIUM 1000 [USP'U]/ML
INJECTION, SOLUTION INTRAVENOUS; SUBCUTANEOUS AS NEEDED
Status: DISCONTINUED | OUTPATIENT
Start: 2020-07-09 | End: 2020-07-09 | Stop reason: HOSPADM

## 2020-07-09 RX ORDER — LIDOCAINE HYDROCHLORIDE 10 MG/ML
INJECTION INFILTRATION; PERINEURAL AS NEEDED
Status: DISCONTINUED | OUTPATIENT
Start: 2020-07-09 | End: 2020-07-09 | Stop reason: HOSPADM

## 2020-07-09 RX ORDER — SODIUM CHLORIDE 9 MG/ML
3 INJECTION, SOLUTION INTRAVENOUS CONTINUOUS
Status: DISPENSED | OUTPATIENT
Start: 2020-07-09 | End: 2020-07-09

## 2020-07-09 RX ORDER — SODIUM CHLORIDE 9 MG/ML
1.5 INJECTION, SOLUTION INTRAVENOUS CONTINUOUS
Status: DISPENSED | OUTPATIENT
Start: 2020-07-09 | End: 2020-07-09

## 2020-07-09 RX ORDER — SODIUM CHLORIDE 0.9 % (FLUSH) 0.9 %
5-40 SYRINGE (ML) INJECTION EVERY 8 HOURS
Status: DISCONTINUED | OUTPATIENT
Start: 2020-07-09 | End: 2020-07-09 | Stop reason: HOSPADM

## 2020-07-09 RX ORDER — HEPARIN SODIUM 200 [USP'U]/100ML
INJECTION, SOLUTION INTRAVENOUS
Status: COMPLETED | OUTPATIENT
Start: 2020-07-09 | End: 2020-07-09

## 2020-07-09 RX ORDER — MIDAZOLAM HYDROCHLORIDE 1 MG/ML
INJECTION, SOLUTION INTRAMUSCULAR; INTRAVENOUS AS NEEDED
Status: DISCONTINUED | OUTPATIENT
Start: 2020-07-09 | End: 2020-07-09 | Stop reason: HOSPADM

## 2020-07-09 RX ORDER — VERAPAMIL HYDROCHLORIDE 2.5 MG/ML
INJECTION, SOLUTION INTRAVENOUS AS NEEDED
Status: DISCONTINUED | OUTPATIENT
Start: 2020-07-09 | End: 2020-07-09 | Stop reason: HOSPADM

## 2020-07-09 RX ADMIN — SODIUM CHLORIDE 3 ML/KG/HR: 900 INJECTION, SOLUTION INTRAVENOUS at 07:36

## 2020-07-09 NOTE — PROGRESS NOTES
6951:  TRANSFER - IN REPORT:    Verbal report received from GERMAIN GARCIA on Daisha Carrillo , from the Cardiac Cath lab, for routine progression of care. Report consisted of patients Situation, Background, Assessment and Recommendations(SBAR). Information from the following report(s) Procedure Summary, Intake/Output, MAR and Recent Results was reviewed with the receiving clinician. Opportunity for questions and clarification was provided. Assessment completed upon patients arrival to [address] [place] [address] and care assumed. Cardiac Cath Lab Recovery Arrival Note:     Daisha Carrillo arrived to [facility] recovery area. Patient procedure= LHC. Patient on cardiac monitor, non-invasive blood pressure, Patient status doing well without problems. Patient is A&Ox 4. Patient reports no complaints. Procedure site without any bleeding and no hematoma.

## 2020-07-09 NOTE — PROGRESS NOTES
1130:  I have reviewed discharge instructions with the patient. The patient verbalized understanding. 1140: Herber Salas ambulated @ 6041 (time) approximately 25 feet. Patient tolerated ambulation without adverse advents. right radial (right/left, groin/arm)  without bleeding or hematoma noted. 1145:  Patient wheeled out via wheelchair for discharge.

## 2020-07-09 NOTE — DISCHARGE INSTRUCTIONS
Radial Cardiac Catheterization / Angiography Discharge Instructions    It is normal to feel tired the first couple days. Take it easy and follow the physicians instructions. CHECK THE CATHETER INSERTION SITE DAILY:  Remove the wrist dressing 24 hours after the procedure. You may shower 24 hours after the procedure. Wash with soap and water and pat dry. Gentle cleaning of the site with soap and water is sufficient, cover with a dry clean dressing or bandage. Do not apply creams or powders to the area. No soaking the wrist for 3 days. Leave the puncture site open to air after 24 hours post-procedure. CALL THE PHYSICIAN:  If the site becomes red, swollen or feels warm to the touch. If there is bleeding or drainage or if there is unusual pain at the radial site. If there is any minor oozing, you may apply a band-aid and remove after 12 hours. If the bleeding continues, hold pressure with the middle finger against the puncture site and the thumb against the back of the wrist, call 911 to be transported to the hospital.  DO NOT DRIVE YOURSELF, [facility]73. ACTIVITY:  For the first 24 hours do not manipulate the wrist.  No lifting, pushing or pulling over 3-5 pounds with the affected wrist for 7 days and no straining the insertion site. Do not lift grocery bags or the garbage can, do not run the vacuum  or  for 7 days. Start with short walks as in the hospital and gradually increase as tolerated each day. It is recommended to walk 30 minutes 5-7 days per week. Follow your physicians instructions on activity. Avoid walking outside in extremes of heat or cold. Walk inside when it is cold and windy or hot and humid. THINGS TO KEEP IN MIND:  No driving for at least 24 hours, or as designated by your physician. Limit the number of times you go up and down the stairs  Take rests and pace yourself with activity.   Be careful and do not strain with bowel movements. MEDICATIONS:  Take all medications as prescribed. Call your physician if you have any questions. Keep an updated list of your medications with you at all times and give a list to your physician and pharmacist.    SIGNS AND SYMPTOMS:  Be cautions of symptoms of angina or recurrent symptoms such as chest discomfort, unusual shortness of breath or fatigue. These could be symptoms of restenosis, a new blockage or a heart attack. If your symptoms are relieved with rest it is still recommended that you notify your physician of recurrent chest pain or discomfort. For CHEST PAIN or symptoms of angina not relieved with rest:  If the discomfort is not relieved with rest and you have been prescribed Nitroglycerin, take as directed (taken under the tongue, one at a time 5 minutes apart for a total of 3 doses). If the discomfort is not relieved after the 3rd nitroglycerin, call 911. AFTER CARE:  Follow up with you physician as instructed. Follow a heart healthy diet with proper portion control, daily stress management, daily      exercise, blood pressure and cholesterol control, and smoking cessation.

## 2020-07-09 NOTE — PROGRESS NOTES
0745:  Cardiac Cath Lab Procedure Area Arrival Note:    Rachelle Gandhi arrived to Cardiac Cath Lab, Procedure Area. Patient identifiers verified with NAME and DATE OF BIRTH. Procedure verified with patient. Consent forms verified. Allergies verified. Patient informed of procedure and plan of care. Questions answered with review. Patient voiced understanding of procedure and plan of care. Patient on cardiac monitor, non-invasive blood pressure, SPO2 monitor. On RA placed on O2 @ 2 lpm via NC.  IV of ns on pump at 224 ml/hr. Patient status doing well without problems. Patient is A&Ox 4. Patient reports no pain. Patient medicated during procedure with orders obtained and verified by Dr. Cheryl Harada. Refer to patients Cardiac Cath Lab PROCEDURE REPORT for vital signs, assessment, status, and response during procedure, printed at end of case. Printed report on chart or scanned into chart. Patient states \"I did fine with contrast in the past.\" MD aware, no new orders received. 0825: Transfer to [facility] from Procedure Area    Verbal report given to GERMAIN north on Rachelle Gandhi being transferred to Cardiac Cath Lab  for routine post - op   Patient is post Coronary Angio procedure. Patient stable upon transfer to . Report consisted of patients Situation, Background, Assessment and   Recommendations(SBAR). Information from the following report(s) Procedure Summary and MAR was reviewed with the receiving nurse. Opportunity for questions and clarification was provided. Patient medicated during procedure with orders obtained and verified by Dr. Cheryl Harada. Refer to patient PROCEDURE REPORT for vital signs, assessment, status, and response during procedure.

## 2020-07-11 LAB — SARS-COV-2, NAA: NOT DETECTED

## 2020-11-20 ENCOUNTER — OFFICE VISIT (OUTPATIENT)
Dept: NEUROLOGY | Age: 65
End: 2020-11-20
Payer: COMMERCIAL

## 2020-11-20 VITALS
RESPIRATION RATE: 16 BRPM | DIASTOLIC BLOOD PRESSURE: 80 MMHG | SYSTOLIC BLOOD PRESSURE: 152 MMHG | OXYGEN SATURATION: 98 % | HEART RATE: 58 BPM | HEIGHT: 63 IN | TEMPERATURE: 97.3 F | WEIGHT: 170 LBS | BODY MASS INDEX: 30.12 KG/M2

## 2020-11-20 DIAGNOSIS — M54.16 LUMBAR BACK PAIN WITH RADICULOPATHY AFFECTING LEFT LOWER EXTREMITY: ICD-10-CM

## 2020-11-20 DIAGNOSIS — I65.23 BILATERAL CAROTID ARTERY STENOSIS: ICD-10-CM

## 2020-11-20 DIAGNOSIS — Z82.49 FAMILY HISTORY OF CEREBRAL ANEURYSM: ICD-10-CM

## 2020-11-20 DIAGNOSIS — G43.109 MIGRAINE WITH AURA AND WITHOUT STATUS MIGRAINOSUS, NOT INTRACTABLE: ICD-10-CM

## 2020-11-20 DIAGNOSIS — I67.89 CEREBRAL MICROVASCULAR DISEASE: ICD-10-CM

## 2020-11-20 DIAGNOSIS — G56.03 BILATERAL CARPAL TUNNEL SYNDROME: ICD-10-CM

## 2020-11-20 DIAGNOSIS — I67.1 CEREBRAL ANEURYSM WITHOUT RUPTURE: Primary | ICD-10-CM

## 2020-11-20 DIAGNOSIS — M54.16 LUMBAR BACK PAIN WITH RADICULOPATHY AFFECTING RIGHT LOWER EXTREMITY: ICD-10-CM

## 2020-11-20 PROCEDURE — 99244 OFF/OP CNSLTJ NEW/EST MOD 40: CPT | Performed by: PSYCHIATRY & NEUROLOGY

## 2020-11-20 NOTE — LETTER
20 Patient: Manuel Diaz YOB: 1955 Date of Visit: 2020 Cesario Yoon MD 
[address] [address] VIA Facsimile: [phone] Dear Cesario Yoon MD, Thank you for referring Ms. Manuel Diaz to [address] [place] [address] for evaluation. My notes for this consultation are attached. Consult REFERRED BY: 
David Georges MD 
 
CHIEF COMPLAINT: History of migraine headaches, and history of possible aneurysm Subjective: Manuel Diaz is a 60 y.o. right-handed  female, seen as a new patient to me, at the request of Dr. Jose Montelongo of new problem of patient having paternal grandmother  of a cerebral hemorrhage from cerebral aneurysm, and patient's brother dying of a cerebral hemorrhage and aneurysm recently, and patient having a past history of possible aneurysm found 2016 on an MRA of the brain. It was in the right posterior cerebral P1 branch, and was described as either a possible aneurysm or infundibulum. The patient is concerned because of her family history of cerebral aneurysms and the 2 deaths, and wants to know if she has one before it ruptures. She does have migraine headaches, but no major increased recently. Her aneurysm size is measured at 2 mm 4 years ago. Otherwise the MRI scan was normal.  She had no other focal weakness, sensory loss, head trauma, fever, meningismus, or any other focal neurological deficit, no cognitive impairment and no cranial nerve problems. Her past medical history pertinent for kidney stones, essential hypertension, skin cancer on her nose, and coronary artery disease. He advised the patient that the single most important thing she can do to prevent any enlargement or aneurysm or complication from the aneurysm is control her blood pressure. We will proceed with an MRA of the brain, and we may need to do a CTA as recommended last time possibly. Past Medical History: Diagnosis Date  CAD (coronary artery disease) 2019  
 stent and NSTEMI  Cancer ([facility] [address]   
 on nose  Gout  Hypertension  Kidney stone Past Surgical History:  
Procedure Laterality Date  HX APPENDECTOMY  HX HEENT Tonsilectomy  HX HYSTERECTOMY  HX OTHER SURGICAL    
 surgey on nose for cancer  HX OTHER SURGICAL    
 bunionectomy Family History Problem Relation Age of Onset  Cancer Mother  Diabetes Mother  Heart Disease Mother  Hypertension Mother  Kidney Disease Mother  Diabetes Father  Kidney Disease Father   
     had transplant  Heart Disease Father  Other Brother   
     cerebral aneurysm  Other Maternal Grandmother   
     cerebral aneurysm  Sleep Apnea Son   
  
Social History Tobacco Use  Smoking status: Former Smoker Packs/day: 0.50 Years: 30.00 Pack years: 15.00 Last attempt to quit: 2019 Years since quittin.2  Smokeless tobacco: Never Used Substance Use Topics  Alcohol use: Yes Comment: occationally Current Outpatient Medications:  
  rosuvastatin (CRESTOR) 20 mg tablet, Take 1 Tab by mouth nightly., Disp: 60 Tab, Rfl: 3 
  aspirin 81 mg chewable tablet, Take 1 Tab by mouth daily. , Disp: 60 Tab, Rfl: 3 
  carvedilol (COREG) 3.125 mg tablet, Take 1 Tab by mouth two (2) times daily (with meals). (Patient taking differently: Take 6.25 mg by mouth two (2) times daily (with meals). ), Disp: 60 Tab, Rfl: 3 
  losartan (COZAAR) 100 mg tablet, Take 1 Tab by mouth daily (with breakfast). (Patient taking differently: Take 50 mg by mouth daily (with breakfast). ), Disp: 60 Tab, Rfl: 3 
  sour cherry extract (TART CHERRY EXTRACT) 1,000 mg cap, Take 2 Caps by mouth two (2) times a day.  Takes 4 capsules twice daily for active gout flares   Indications: gout, Disp: , Rfl:  
   vitamin C-multivitamin-mineral (EMERGEN-C) 500 mg chew, Take 3 Tabs by mouth two (2) times a day., Disp: , Rfl:  
 
 
 
Allergies Allergen Reactions  Morphine Rash  Pcn [Penicillins] Unknown (comments)  Renografin-60 [Diatrizoate Christine-Diatrizoat Sod] Angioedema MRI Results (most recent): 
Results from Hospital Encounter encounter on 04/20/16 MRA BRAIN WO CONT Narrative **Final Report** 
  
 
ICD Codes / Adm. Diagnosis: V76.12  Z12.31 / Family history of stroke (cere Family history of ischemic h Examination:  MR HEAD MRA WO CON  - [id number] - Apr 20 2016  1:11PM 
Accession No:  [id number] Reason:  Family history of cerebral aneurysm REPORT: 
INDICATION: Family history of cerebral aneurysm COMPARISON:  None TECHNIQUE:  3-D time-of-flight MRA of the brain was performed. Multiplanar  
reconstructions were obtained. FINDINGS: 
 
The vertebral arteries are codominant. The basilar artery and its branches  
are normal. The internal carotid, anterior cerebral, and middle cerebral  
arteries are patent. There is no flow-limiting intracranial stenosis. There  
is a 2 mm protrusion from the posterior right P1 branch on series 2 image 78. . There are no sizable posterior communicating arteries. IMPRESSION: 
Small protrusion from the posterior right P1 branch which may represent a  
small aneurysm versus possibly an infundibulum of an originating vessel. Recommend CTA for further evaluation. 23X Signing/Reading Doctor: Micki Cotton ([id number]) ApprovedEarline Zina ([id number])  Apr 20 2016  2:18PM                         
 
 
   
 
 
Results from [facility] Encounter encounter on 04/20/16 MRA BRAIN WO CONT Narrative **Final Report** 
  
 
ICD Codes / Adm. Diagnosis: V76.12  Z12.31 / Family history of stroke (cere Family history of ischemic h Examination:  MR HEAD MRA WO CON  - [id number] - Apr 20 2016  1:11PM 
Accession No:  [id number] Reason:  Family history of cerebral aneurysm REPORT: 
INDICATION: Family history of cerebral aneurysm COMPARISON:  None TECHNIQUE:  3-D time-of-flight MRA of the brain was performed. Multiplanar  
reconstructions were obtained. FINDINGS: 
 
The vertebral arteries are codominant. The basilar artery and its branches  
are normal. The internal carotid, anterior cerebral, and middle cerebral  
arteries are patent. There is no flow-limiting intracranial stenosis. There  
is a 2 mm protrusion from the posterior right P1 branch on series 2 image 78. . There are no sizable posterior communicating arteries. IMPRESSION: 
Small protrusion from the posterior right P1 branch which may represent a  
small aneurysm versus possibly an infundibulum of an originating vessel. Recommend CTA for further evaluation. 23X Signing/Reading Doctor: Arina Hayward ([id number]) UgoTotorsten Suh ([id number])  Apr 20 2016  2:18PM                         
 
 
   
 
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  
 11/20/20 1331 BP: (!) 152/80 Pulse: (!) 58 Resp: 16 Temp: 97.3 °F (36.3 °C) SpO2: 98% Weight: 170 lb (77.1 kg) Height: 5' 3\" (1.6 m) Objective: I 
 
 
NEUROLOGICAL EXAM: 
 
Appearance: The patient is well developed, well nourished, provides a coherent history and is in no acute distress. Mental Status: Oriented to time, place and person, and the president, cognitive function is normal and speech is fluent and no aphasia or dysarthria. Mood and affect appropriate. Cranial Nerves:   Intact visual fields. Fundi are benign, disc are flat, no lesions seen on funduscopy. EDWIN, EOM's full, no nystagmus, no ptosis. Facial sensation is normal. Corneal reflexes are not tested. Facial movement is symmetric. Hearing is normal bilaterally. Palate is midline with normal sternocleidomastoid and trapezius muscles are normal. Tongue is midline. Neck without meningismus or bruits Temporal arteries are not tender or enlarged TMJ areas are not tender on palpation Motor:  5/5 strength in upper and lower proximal and distal muscles. Normal bulk and tone. No fasciculations. Rapid alternating movement is symmetric and intact bilaterally Reflexes:   Deep tendon reflexes 2+/4 and symmetrical. 
No babinski or clonus present Sensory:   Normal to touch, pinprick and vibration and temperature. DSS is intact Gait:  Normal gait for patient's age. Tremor:   No tremor noted. Cerebellar:  No abnormal cerebellar signs present on Romberg and tandem testing and finger-nose-finger exam.  
Neurovascular:  Normal heart sounds and regular rhythm, peripheral pulses intact, and no carotid bruits. Assessment: ICD-10-CM ICD-9-CM 1. Cerebral aneurysm without rupture  I67.1 437.3 MRA BRAIN WO CONT  
   DUPLEX CAROTID BILATERAL 2. Family history of cerebral aneurysm  Z82.49 V17.1 MRA BRAIN WO CONT  
   DUPLEX CAROTID BILATERAL 3. Migraine with aura and without status migrainosus, not intractable  G43.109 346.00 MRA BRAIN WO CONT  
   DUPLEX CAROTID BILATERAL 4. Bilateral carotid artery stenosis  I65.23 433.10 MRA BRAIN WO CONT  
  433.30 DUPLEX CAROTID BILATERAL 5. Cerebral microvascular disease  I67.89 437.8 MRA BRAIN WO CONT  
   DUPLEX CAROTID BILATERAL Active Problems: * No active hospital problems. * 
 
 
Plan: Because of her family history of aneurysms, the patient will get a MRA repeat, and because of her age of 60 and hypertension, we will check a carotid Doppler study to make sure there is no carotid stenosis or other flow abnormalities. Patient will check results on my chart, or call us for results when done. She is encouraged to make sure that she controls her blood pressure well, as the main risk factor for aneurysm growth. Follow-up in 6 months time or earlier if need be if there is only a small aneurysm, and she may need referral to interventional neuroradiology should she have any enlargement or growth of the aneurysm. We will follow closely, 50 minutes spent with the patient, over history, reviewing her films personally on the PACS system, and discussing her diagnosis prognosis and treatment options. Signed By: Haydee Centeno MD   
 November 20, 2020 CC: Tamiko Lozano MD 
FAX: [phone] If you have questions, please do not hesitate to call me. I look forward to following your patient along with you. Sincerely, Haydee Centeno MD

## 2020-11-20 NOTE — PROGRESS NOTES
Consult  REFERRED BY:  Aries Garcia MD    CHIEF COMPLAINT: History of migraine headaches, and history of possible aneurysm      Subjective: Silke Rodríguez is a 60 y.o. right-handed  female, seen as a new patient to me, at the request of Dr. Jann Jacques of new problem of patient having paternal grandmother  of a cerebral hemorrhage from cerebral aneurysm, and patient's brother dying of a cerebral hemorrhage and aneurysm recently, and patient having a past history of possible aneurysm found  on an MRA of the brain. It was in the right posterior cerebral P1 branch, and was described as either a possible aneurysm or infundibulum. The patient is concerned because of her family history of cerebral aneurysms and the 2 deaths, and wants to know if she has one before it ruptures. She does have migraine headaches, but no major increased recently. Her aneurysm size is measured at 2 mm 4 years ago. Otherwise the MRI scan was normal.  She had no other focal weakness, sensory loss, head trauma, fever, meningismus, or any other focal neurological deficit, no cognitive impairment and no cranial nerve problems. Her past medical history pertinent for kidney stones, essential hypertension, skin cancer on her nose, and coronary artery disease. He advised the patient that the single most important thing she can do to prevent any enlargement or aneurysm or complication from the aneurysm is control her blood pressure. We will proceed with an MRA of the brain, and we may need to do a CTA as recommended last time possibly.   Patient also complains of some intermittent numbness in her hands when she uses them, and when she sleeps at night, sounds like carpal tunnel, and she complains of numbness radiating down the sides of her legs bilaterally, and tingling, that seem to be getting worse, and she has some mild back discomfort, so we will schedule her for an EMG of her arms and legs to rule out carpal tunnel syndrome and rule out lumbar radiculopathy. Past Medical History:   Diagnosis Date    CAD (coronary artery disease) 2019    stent and NSTEMI    Cancer ([facility] [address]     on nose    Gout     Hypertension     Kidney stone       Past Surgical History:   Procedure Laterality Date    HX APPENDECTOMY      HX HEENT      Tonsilectomy    HX HYSTERECTOMY      HX OTHER SURGICAL      surgey on nose for cancer    HX OTHER SURGICAL      bunionectomy     Family History   Problem Relation Age of Onset    Cancer Mother     Diabetes Mother     Heart Disease Mother     Hypertension Mother     Kidney Disease Mother     Diabetes Father     Kidney Disease Father         had transplant    Heart Disease Father     Other Brother         cerebral aneurysm    Other Maternal Grandmother         cerebral aneurysm    Sleep Apnea Son       Social History     Tobacco Use    Smoking status: Former Smoker     Packs/day: 0.50     Years: 30.00     Pack years: 15.00     Last attempt to quit: 2019     Years since quittin.2    Smokeless tobacco: Never Used   Substance Use Topics    Alcohol use: Yes     Comment: occationally         Current Outpatient Medications:     rosuvastatin (CRESTOR) 20 mg tablet, Take 1 Tab by mouth nightly., Disp: 60 Tab, Rfl: 3    aspirin 81 mg chewable tablet, Take 1 Tab by mouth daily. , Disp: 60 Tab, Rfl: 3    carvedilol (COREG) 3.125 mg tablet, Take 1 Tab by mouth two (2) times daily (with meals). (Patient taking differently: Take 6.25 mg by mouth two (2) times daily (with meals). ), Disp: 60 Tab, Rfl: 3    losartan (COZAAR) 100 mg tablet, Take 1 Tab by mouth daily (with breakfast). (Patient taking differently: Take 50 mg by mouth daily (with breakfast). ), Disp: 60 Tab, Rfl: 3    sour cherry extract (TART CHERRY EXTRACT) 1,000 mg cap, Take 2 Caps by mouth two (2) times a day.  Takes 4 capsules twice daily for active gout flares   Indications: gout, Disp: , Rfl:     vitamin C-multivitamin-mineral (EMERGEN-C) 500 mg chew, Take 3 Tabs by mouth two (2) times a day., Disp: , Rfl:         Allergies   Allergen Reactions    Morphine Rash    Pcn [Penicillins] Unknown (comments)    Renografin-60 [Diatrizoate Christine-Diatrizoat Sod] Angioedema      MRI Results (most recent):  Results from [place] encounter on 04/20/16   MRA BRAIN WO CONT    Narrative **Final Report**       ICD Codes / Adm. Diagnosis: V76.12  Z12.31 / Family history of stroke (cere    Family history of ischemic h  Examination:  MR HEAD MRA WO CON  - [id number] - Apr 20 2016  1:11PM  Accession No:  [id number]  Reason:  Family history of cerebral aneurysm      REPORT:  INDICATION: Family history of cerebral aneurysm    COMPARISON:  None    TECHNIQUE:  3-D time-of-flight MRA of the brain was performed. Multiplanar   reconstructions were obtained. FINDINGS:    The vertebral arteries are codominant. The basilar artery and its branches   are normal. The internal carotid, anterior cerebral, and middle cerebral   arteries are patent. There is no flow-limiting intracranial stenosis. There   is a 2 mm protrusion from the posterior right P1 branch on series 2 image   78. . There are no sizable posterior communicating arteries. IMPRESSION:  Small protrusion from the posterior right P1 branch which may represent a   small aneurysm versus possibly an infundibulum of an originating vessel. Recommend CTA for further evaluation. 23X              Signing/Reading Doctor: Betty Zamorano ([id number])    Approved: Betty Zamorano ([id number])  Apr 20 2016  2:18PM                                      Results from Hospital Encounter encounter on 04/20/16   MRA BRAIN WO CONT    Narrative **Final Report**       ICD Codes / Adm. Diagnosis: V76.12  Z12.31 / Family history of stroke (cere    Family history of ischemic h  Examination:  MR HEAD MRA WO CON  - [id number] - Apr 20 2016  1:11PM  Accession No:  [id number]  Reason:  Family history of cerebral aneurysm      REPORT:  INDICATION: Family history of cerebral aneurysm    COMPARISON:  None    TECHNIQUE:  3-D time-of-flight MRA of the brain was performed. Multiplanar   reconstructions were obtained. FINDINGS:    The vertebral arteries are codominant. The basilar artery and its branches   are normal. The internal carotid, anterior cerebral, and middle cerebral   arteries are patent. There is no flow-limiting intracranial stenosis. There   is a 2 mm protrusion from the posterior right P1 branch on series 2 image   78. . There are no sizable posterior communicating arteries. IMPRESSION:  Small protrusion from the posterior right P1 branch which may represent a   small aneurysm versus possibly an infundibulum of an originating vessel. Recommend CTA for further evaluation. 23X              Signing/Reading Doctor: Konrad Florez ([id number])    Approved: Konrad Florez ([id number])  Apr 20 2016  2:18PM                                    Review of Systems:  A comprehensive review of systems was negative except for: Neurological: positive for headaches and Nonruptured cerebral aneurysm of the right P1 area 2 mm in size   Vitals:    11/20/20 1331   BP: (!) 152/80   Pulse: (!) 58   Resp: 16   Temp: 97.3 °F (36.3 °C)   SpO2: 98%   Weight: 170 lb (77.1 kg)   Height: 5' 3\" (1.6 m)     Objective:     I      NEUROLOGICAL EXAM:    Appearance: The patient is well developed, well nourished, provides a coherent history and is in no acute distress. Mental Status: Oriented to time, place and person, and the president, cognitive function is normal and speech is fluent and no aphasia or dysarthria. Mood and affect appropriate. Cranial Nerves:   Intact visual fields. Fundi are benign, disc are flat, no lesions seen on funduscopy. EDWIN, EOM's full, no nystagmus, no ptosis. Facial sensation is normal. Corneal reflexes are not tested. Facial movement is symmetric. Hearing is normal bilaterally.  Palate is midline with normal sternocleidomastoid and trapezius muscles are normal. Tongue is midline. Neck without meningismus or bruits  Temporal arteries are not tender or enlarged  TMJ areas are not tender on palpation   Motor:  5/5 strength in upper and lower proximal and distal muscles. Normal bulk and tone. No fasciculations. Rapid alternating movement is symmetric and intact bilaterally   Reflexes:   Deep tendon reflexes 2+/4 and symmetrical.  No babinski or clonus present   Sensory:   Normal to touch, pinprick and vibration and temperature. DSS is intact   Gait:  Normal gait for patient's age. Tremor:   No tremor noted. Cerebellar:  No abnormal cerebellar signs present on Romberg and tandem testing and finger-nose-finger exam.   Neurovascular:  Normal heart sounds and regular rhythm, peripheral pulses intact, and no carotid bruits. Assessment:       ICD-10-CM ICD-9-CM    1. Cerebral aneurysm without rupture  I67.1 437.3 MRA BRAIN WO CONT      DUPLEX CAROTID BILATERAL   2. Family history of cerebral aneurysm  Z82.49 V17.1 MRA BRAIN WO CONT      DUPLEX CAROTID BILATERAL   3. Migraine with aura and without status migrainosus, not intractable  G43.109 346.00 MRA BRAIN WO CONT      DUPLEX CAROTID BILATERAL   4. Bilateral carotid artery stenosis  I65.23 433.10 MRA BRAIN WO CONT     433.30 DUPLEX CAROTID BILATERAL   5. Cerebral microvascular disease  I67.89 437.8 MRA BRAIN WO CONT      DUPLEX CAROTID BILATERAL     Active Problems:    * No active hospital problems. *      Plan:     Because of her family history of aneurysms, the patient will get a MRA repeat, and because of her age of 60 and hypertension, we will check a carotid Doppler study to make sure there is no carotid stenosis or other flow abnormalities. Patient will check results on my chart, or call us for results when done. She is encouraged to make sure that she controls her blood pressure well, as the main risk factor for aneurysm growth.   Patient also complains of some intermittent numbness in her hands when she uses them, and when she sleeps at night, sounds like carpal tunnel, and she complains of numbness radiating down the sides of her legs bilaterally, and tingling, that seem to be getting worse, and she has some mild back discomfort, so we will schedule her for an EMG of her arms and legs to rule out carpal tunnel syndrome and rule out lumbar radiculopathy. Follow-up in 6 months time or earlier if need be if there is only a small aneurysm, and she may need referral to interventional neuroradiology should she have any enlargement or growth of the aneurysm. We will follow closely, 50 minutes spent with the patient, over history, reviewing her films personally on the PACS system, and discussing her diagnosis prognosis and treatment options.     Signed By: Sally Hannah MD     November 20, 2020       CC: Dony Ha MD  FAX: [phone]

## 2020-12-04 ENCOUNTER — HOSPITAL ENCOUNTER (OUTPATIENT)
Dept: MRI IMAGING | Age: 65
Discharge: HOME | End: 2020-12-04
Attending: PSYCHIATRY & NEUROLOGY
Payer: COMMERCIAL

## 2020-12-04 DIAGNOSIS — I65.23 BILATERAL CAROTID ARTERY STENOSIS: ICD-10-CM

## 2020-12-04 DIAGNOSIS — Z82.49 FAMILY HISTORY OF CEREBRAL ANEURYSM: ICD-10-CM

## 2020-12-04 DIAGNOSIS — I67.89 CEREBRAL MICROVASCULAR DISEASE: ICD-10-CM

## 2020-12-04 DIAGNOSIS — I67.1 CEREBRAL ANEURYSM WITHOUT RUPTURE: ICD-10-CM

## 2020-12-04 DIAGNOSIS — G43.109 MIGRAINE WITH AURA AND WITHOUT STATUS MIGRAINOSUS, NOT INTRACTABLE: ICD-10-CM

## 2020-12-04 PROCEDURE — 70544 MR ANGIOGRAPHY HEAD W/O DYE: CPT

## 2020-12-07 ENCOUNTER — DOCUMENTATION ONLY (OUTPATIENT)
Dept: NEUROLOGY | Age: 65
End: 2020-12-07

## 2020-12-07 NOTE — PROGRESS NOTES
I called the patient, told her MRI scan was good, just showed infundibulum, no aneurysm, and otherwise was normal, Doppler showed mild disease only normal for age, patient to get EMG on December 16 and we will see her then

## 2020-12-16 ENCOUNTER — HOSPITAL ENCOUNTER (OUTPATIENT)
Dept: GENERAL RADIOLOGY | Age: 65
Discharge: HOME | End: 2020-12-16
Payer: COMMERCIAL

## 2020-12-16 ENCOUNTER — OFFICE VISIT (OUTPATIENT)
Dept: NEUROLOGY | Age: 65
End: 2020-12-16
Payer: COMMERCIAL

## 2020-12-16 VITALS — TEMPERATURE: 45.1 F

## 2020-12-16 DIAGNOSIS — M54.16 LUMBAR BACK PAIN WITH RADICULOPATHY AFFECTING LEFT LOWER EXTREMITY: Primary | ICD-10-CM

## 2020-12-16 DIAGNOSIS — E55.9 VITAMIN D DEFICIENCY: ICD-10-CM

## 2020-12-16 DIAGNOSIS — E11.42 DIABETIC PERIPHERAL NEUROPATHY ASSOCIATED WITH TYPE 2 DIABETES MELLITUS (HCC): ICD-10-CM

## 2020-12-16 DIAGNOSIS — E10.42 DIABETIC PERIPHERAL NEUROPATHY ASSOCIATED WITH TYPE 1 DIABETES MELLITUS (HCC): ICD-10-CM

## 2020-12-16 DIAGNOSIS — E53.8 B12 DEFICIENCY: ICD-10-CM

## 2020-12-16 DIAGNOSIS — M54.16 LUMBAR BACK PAIN WITH RADICULOPATHY AFFECTING LEFT LOWER EXTREMITY: ICD-10-CM

## 2020-12-16 DIAGNOSIS — G56.22 ULNAR NEUROPATHY AT ELBOW OF LEFT UPPER EXTREMITY: ICD-10-CM

## 2020-12-16 DIAGNOSIS — M54.16 LUMBAR BACK PAIN WITH RADICULOPATHY AFFECTING RIGHT LOWER EXTREMITY: ICD-10-CM

## 2020-12-16 DIAGNOSIS — G60.8 IDIOPATHIC SMALL FIBER SENSORY NEUROPATHY: ICD-10-CM

## 2020-12-16 DIAGNOSIS — G56.03 BILATERAL CARPAL TUNNEL SYNDROME: ICD-10-CM

## 2020-12-16 DIAGNOSIS — G60.9 IDIOPATHIC SMALL FIBER PERIPHERAL NEUROPATHY: ICD-10-CM

## 2020-12-16 PROCEDURE — 95913 NRV CNDJ TEST 13/> STUDIES: CPT | Performed by: PSYCHIATRY & NEUROLOGY

## 2020-12-16 PROCEDURE — 95886 MUSC TEST DONE W/N TEST COMP: CPT | Performed by: PSYCHIATRY & NEUROLOGY

## 2020-12-16 PROCEDURE — 72110 X-RAY EXAM L-2 SPINE 4/>VWS: CPT

## 2020-12-16 NOTE — PROCEDURES
ELECTRODIAGNOSTIC REPORT      Test Date:  2020    Patient: Selena Mendoza : 1955 Physician: Ashu Escudero M.D. Sex: Female  < Ref Phys:      Technician: Grupo Alvarado    Patient History: Patient is a 70-year-old female with a history of some numbness in both hands, right greater than left, worse when she wakes up in the morning, with possible carpal tunnel syndrome. Patient also with some numbness in her feet and burning pain in her feet, to rule out neuropathy. Neuro Exam: Patient is strength and muscle bulk and tone is normal in all extremities, and she has no Babinski or clonus present. Deep tendon reflexes are slightly hypoactive but symmetric throughout. Sensory examination shows slight decreased temperature and vibration in both feet to ankle level. Cranial nerves II through XII intact. Patient's gait and station since examination is normal.    EMG report: This study shows electrophysiologic evidence of    1.)  Bilateral median nerve entrapment at the wrist, right side greater than left, consistent with probable carpal tunnel syndromes, as manifest by the prolonged distal motor latencies bilaterally, right greater than left, and unobtainable sensory latencies. There was no evidence of denervation changes seen of either acute or chronic nature on EMG study in either abductor pollicis brevis. 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 3.)  A mild probable compressive neuropathy of the ulnar nerve at the elbow on the left side consistent with tardy ulnar palsy, as manifest by the mild swelling on proximal stimulation around the elbow, but without denervation changes or axonal changes seen in the intrinsic ulnar innervated muscles in the hand.   Clinical correlation recommended, and correlation with metabolic studies may be of further diagnostic benefit, and follow-up studies in 6 to 12 months time may also be of further diagnostic benefit. EMG & NCV Findings:  Evaluation of the left Fibular motor and the right Fibular motor nerves showed normal distal onset latency (L5.0, R4.1 ms), normal amplitude (L5.0, R4.0 mV), normal conduction velocity (B Fib-Ankle, L48, R47 m/s), and normal conduction velocity (Poplt-B Fib, L50, R56 m/s). The left median motor and the right median motor nerves showed prolonged distal onset latency (L5.5, R7.3 ms), normal amplitude (L4.5, R5.7 mV), and normal conduction velocity (Elbow-Wrist, L56, R54 m/s). The left tibial motor and the right tibial motor nerves showed normal distal onset latency (L5.1, R5.2 ms), normal amplitude (L7.9, R8.5 mV), and normal conduction velocity (Knee-Ankle, L40, R54 m/s). The left ulnar motor nerve showed normal distal onset latency (2.7 ms), reduced amplitude (6.7 mV), decreased conduction velocity (Wrist-Abd Dig Minimi, 30 m/s), normal conduction velocity (B Elbow-Wrist, 60 m/s), and normal conduction velocity (A Elbow-B Elbow, 53 m/s). The right ulnar motor nerve showed normal distal onset latency (2.7 ms), reduced amplitude (6.7 mV), decreased conduction velocity (Wrist-Abd Dig Minimi, 30 m/s), normal conduction velocity (B Elbow-Wrist, 61 m/s), and decreased conduction velocity (A Elbow-B Elbow, 45 m/s). The left median sensory and the right median sensory nerves showed no response (Wrist). The left radial sensory, the right radial sensory, the left ulnar sensory, and the right ulnar sensory nerves showed normal distal peak latency (L2.0, R1.9, L3.0, R3.3 ms) and normal amplitude (L38.1, R5.8, L10.6, R17.6 µV). The left Sup Fibular sensory and the right Sup Fibular sensory nerves showed no response (Lower leg). The left sural sensory and the right sural sensory nerves showed no response (Calf).   The left median/ulnar (palm) comparison nerve showed prolonged distal peak latency (Median Palm, 3.8 ms), reduced amplitude (Median Palm, 9.6 µV), normal distal peak latency (Ulnar Palm, 1.7 ms), normal amplitude (Ulnar Palm, 16.3 µV), and abnormal peak latency difference (Median Palm-Ulnar Palm, 2.1 ms). The right median/ulnar (palm) comparison nerve showed no response (Median Palm), normal distal peak latency (Ulnar Palm, 1.5 ms), and reduced amplitude (Ulnar Palm, 7.2 µV). All left vs. right side differences were within normal limits.                 __________________  Garry Breaux M.D.        Nerve Conduction Studies  Anti Sensory Summary Table     Stim Site NR Onset (ms) Peak (ms) O-P Amp (µV) Norm Peak (ms) Norm O-P Amp Site1 Site2 Dist (cm) Norm Kelby (m/s)   Left Median Anti Sensory (2nd Digit)  34.9°C   Wrist NR    <4 >11 Wrist 2nd Digit 14.0    Right Median Anti Sensory (2nd Digit)  34.9°C   Wrist NR    <4 >11 Wrist 2nd Digit 14.0    Left Radial Anti Sensory (Base 1st Digit)  34.9°C   Wrist    1.3 2.0 38.1 <2.8 7 Wrist Base 1st Digit 10.0    Right Radial Anti Sensory (Base 1st Digit)  34.9°C   Wrist    1.4 1.9 5.8 <2.8 7 Wrist Base 1st Digit 10.0    Left Sup Fibular Anti Sensory (Lat ankle)  34.9°C   Lower leg NR    <4.4 >5.0 Lower leg Lat ankle 10.0 >32   Right Sup Fibular Anti Sensory (Lat ankle)  34.9°C   Lower leg NR    <4.4 >5.0 Lower leg Lat ankle 10.0 >32   Left Sural Anti Sensory (Lat Mall)  34.9°C   Calf NR    <4.5 >4.0 Calf Lat Mall 14.0    Right Sural Anti Sensory (Lat Mall)  34.9°C   Calf NR    <4.5 >4.0 Calf Lat Mall 14.0    Left Ulnar Anti Sensory (5th Digit)  34.9°C   Wrist    2.3 3.0 10.6 <4.0 >10 Wrist 5th Digit 14.0    Right Ulnar Anti Sensory (5th Digit)  34.9°C   Wrist    2.3 3.3 17.6 <4.0 >10 Wrist 5th Digit 14.0      Motor Summary Table     Stim Site NR Onset (ms) Norm Onset (ms) O-P Amp (mV) Norm O-P Amp P-T Amp (mV) Site1 Site2 Dist (cm) Kelby (m/s)   Left Fibular Motor (Ext Dig Brev)  34.9°C   Ankle    5.0 <6.5 5.0 >1.1  Ankle Ext Dig Brev 8.0 16   B Fib    11.5  4.6   B Fib Ankle 31.0 48   Poplt    13.5  4.5   Poplt B Fib 10.0 50   Right Fibular Motor (Ext Dig Brev)  34.9°C   Ankle    4.1 <6.5 4.0 >1.1  Ankle Ext Dig Brev 8.0 20   B Fib    10.5  3.1   B Fib Ankle 30.0 47   Poplt    12.3  3.2   Poplt B Fib 10.0 56   Left Median Motor (Abd Poll Brev)  34.9°C   Wrist    5.5 <4.5 4.5 >4.1  Wrist Abd Poll Brev 8.0 15   Elbow    9.4  4.4   Elbow Wrist 22.0 56   Right Median Motor (Abd Poll Brev)  34.9°C   Wrist    7.3 <4.5 5.7 >4.1  Wrist Abd Poll Brev 8.0 11   Elbow    11.2  5.2   Elbow Wrist 21.0 54   Left Tibial Motor (Abd Hyman Brev)  34.9°C   Ankle    5.1 <6.1 7.9 >1.1  Ankle Abd Hyman Brev 8.0 16   Knee    14.5  6.9   Knee Ankle 38.0 40   Right Tibial Motor (Abd Hyman Brev)  34.9°C   Ankle    5.2 <6.1 8.5 >1.1  Ankle Abd Hyman Brev 8.0 15   Knee    12.2  7.1   Knee Ankle 38.0 54   Left Ulnar Motor (Abd Dig Minimi)  34.9°C   Wrist    2.7 <3.1 6.7 >7.0  Wrist Abd Dig Minimi 8.0 30   B Elbow    6.2  6.6   B Elbow Wrist 21.0 60   A Elbow    8.1  4.7   A Elbow B Elbow 10.0 53   Right Ulnar Motor (Abd Dig Minimi)  34.9°C   Wrist    2.7 <3.1 6.7 >7.0  Wrist Abd Dig Minimi 8.0 30   B Elbow    6.0  5.7   B Elbow Wrist 20.0 61   A Elbow    8.2  5.6   A Elbow B Elbow 10.0 45     Comparison Summary Table     Stim Site NR Peak (ms) P-T Amp (µV) Site1 Site2 Dist (cm) Delta-P (ms)   Left Median/Ulnar Palm Comparison (Wrist)  34.9°C   Median Palm    3.8 27.8 Median Palm Ulnar Palm 8.0 2.1   Ulnar Palm    1.7 7.1       Right Median/Ulnar Palm Comparison (Wrist)  34.9°C   Median Palm NR   Median Palm Ulnar Palm 8.0    Ulnar Palm    1.5 95.8         EMG     Side Muscle Nerve Root Ins Act Fibs Psw Recrt Duration Amp Poly Comment   Right AbdHallucis MedPlantar S1-2 Nml Nml Nml Nml Nml Nml Nml    Right MedGastroc Tibial S1-2 Nml Nml Nml Nml Nml Nml Nml    Left AbdHallucis MedPlantar S1-2 Nml Nml Nml Nml Nml Nml Nml    Left MedGastroc Tibial S1-2 Nml Nml Nml Nml Nml Nml Nml    Left BicepsFemL Sciatic L5-S2 Nml Nml Nml Nml Nml Nml Nml    Right BicepsFemL Sciatic L5-S2 Nml Nml Nml Nml Nml Nml Nml    Left BicepsFemS Sciatic L5-S1 Nml Nml Nml Nml Nml Nml Nml    Right Lower Lumb Parasp Rami L5,S1 Nml Nml Nml Nml Nml Nml Nml    Left Lower Lumb Parasp Rami L5,S1 Nml Nml Nml Nml Nml Nml Nml    Right Ext Dig Brev Dp Br Peron L5, S1 Nml Nml Nml Nml Nml Nml Nml    Left Ext Dig Brev Dp Br Peron L5, S1 Nml Nml Nml Nml Nml Nml Nml    Left AntTibialis Dp Br Peron L4-5 Nml Nml Nml Nml Nml Nml Nml    Right AntTibialis Dp Br Peron L4-5 Nml Nml Nml Nml Nml Nml Nml    Right VastusLat Femoral L2-4 Nml Nml Nml Nml Nml Nml Nml    Left VastusLat Femoral L2-4 Nml Nml Nml Nml Nml Nml Nml    Right 1stDorInt Ulnar C8-T1 Nml Nml Nml Nml Nml Nml Nml    Left Abd Poll Brev Median C8-T1 Nml Nml Nml Nml Nml Nml Nml    Left 1stDorInt Ulnar C8-T1 Nml Nml Nml Nml Nml Nml Nml    Right Abd Poll Brev Median C8-T1 Nml Nml Nml Nml Nml Nml Nml    Left FlexPolLong Median (Ant Int) C7-8 Nml Nml Nml Nml Nml Nml Nml    Right FlexPolLong Median (Ant Int) C7-8 Nml Nml Nml Nml Nml Nml Nml    Right Lower Cerv Parasp Rami C7,T1 Nml Nml Nml Nml Nml Nml Nml    Left Lower Cerv Parasp Rami C7,T1 Nml Nml Nml Nml Nml Nml Nml    Right Triceps Radial C6-7-8 Nml Nml Nml Nml Nml Nml Nml    Left Triceps Radial C6-7-8 Nml Nml Nml Nml Nml Nml Nml    Left Deltoid Axillary C5-6 Nml Nml Nml Nml Nml Nml Nml    Right BrachioRad Radial C5-6 Nml Nml Nml Nml Nml Nml Nml    Right Deltoid Axillary C5-6 Nml Nml Nml Nml Nml Nml Nml    Left Biceps Musculocut C5-6 Nml Nml Nml Nml Nml Nml Nml    Right Biceps Musculocut C5-6 Nml Nml Nml Nml Nml Nml Nml    Left BrachioRad Radial C5-6 Nml Nml Nml Nml Nml Nml Nml    Left Peroneus Long   Nml Nml Nml Nml Nml Nml Nml    Left ExtDigCom   Nml Nml Nml Nml Nml Nml Nml    Right Peroneus Long   Nml Nml Nml Nml Nml Nml Nml          Waveforms:

## 2020-12-16 NOTE — LETTER
12/16/2020 8:49 PM 
 
Patient: Gatha Blizzard YOB: 1955 Date of Visit: 12/16/2020 Dear No Recipients: Thank you for referring Ms. Gatha Blizzard to me for evaluation/treatment. Below are the relevant portions of my assessment and plan of care. Patient had EMG study done that showed bilateral carpal tunnel syndromes, right side greater than left, and due to the severity of her symptoms she probably needs decompression and referred to a hand surgeon. Patient will also be referred for skin biopsy for nerve fiber density because she seems to have a small fiber neuropathy on the basis of this study. Also complete metabolic panel sent to rule out neuropathy also. Patient has mild compressive neuropathy of the ulnar nerve at the left elbow, she is advised to keep pressure off her elbow. She will follow up after the above-mentioned tests and consider decompressive surgery on the right hand, and possible steroid injection on the left side and wearing a brace on both hands. If you have questions, please do not hesitate to call me. I look forward to following Ms. Cortes along with you. Sincerely, [facility]

## 2021-01-05 LAB
25(OH)D3+25(OH)D2 SERPL-MCNC: 22.6 NG/ML (ref 30–100)
ANTI NEURONAL CELL AB METHOD: NORMAL
CENTROMERE B AB SER-ACNC: <0.2 AI (ref 0–0.9)
CHROMATIN AB SERPL-ACNC: <0.2 AI (ref 0–0.9)
CK SERPL-CCNC: 69 U/L (ref 32–182)
DSDNA AB SER-ACNC: 2 IU/ML (ref 0–9)
ENA JO1 AB SER-ACNC: <0.2 AI (ref 0–0.9)
ENA RNP AB SER-ACNC: <0.2 AI (ref 0–0.9)
ENA SCL70 AB SER-ACNC: <0.2 AI (ref 0–0.9)
ENA SM AB SER-ACNC: <0.2 AI (ref 0–0.9)
ENA SM+RNP AB SER-ACNC: <0.2 AI (ref 0–0.9)
ENA SS-A AB SER-ACNC: <0.2 AI (ref 0–0.9)
ENA SS-B AB SER-ACNC: <0.2 AI (ref 0–0.9)
EST. AVERAGE GLUCOSE BLD GHB EST-MCNC: 114 MG/DL
FOLATE SERPL-MCNC: 6.2 NG/ML
GM1 GANGL IGG TITR SER IA: <20 % (ref 0–30)
HBA1C MFR BLD: 5.6 % (ref 4.8–5.6)
IGA SERPL-MCNC: 118 MG/DL (ref 87–352)
IGG SERPL-MCNC: 613 MG/DL (ref 586–1602)
IGM SERPL-MCNC: 39 MG/DL (ref 26–217)
INTERPRETATION, 811987: NORMAL
INTERPRETATION,OLIG1: NORMAL
MAG IGM AUTO-AB INTERPRETATION: NORMAL
MAG IGM SER-ACNC: <900 BTU (ref 0–999)
NEURONAL AB SER IA-ACNC: 9 UNITS (ref 0–54)
PROT PATTERN SERPL IFE-IMP: NORMAL
RIBOSOMAL P AB SER-ACNC: <0.2 AI (ref 0–0.9)
SEE BELOW:, 164879: NORMAL
VIT B12 SERPL-MCNC: 212 PG/ML (ref 232–1245)

## 2021-01-10 ENCOUNTER — HOSPITAL ENCOUNTER (OUTPATIENT)
Dept: PREADMISSION TESTING | Age: 66
Discharge: HOME | End: 2021-01-10
Payer: COMMERCIAL

## 2021-01-10 PROCEDURE — 87635 SARS-COV-2 COVID-19 AMP PRB: CPT

## 2021-01-12 LAB
HEALTH STATUS, XMCV2T: NORMAL
SARS-COV-2, COV2NT: NOT DETECTED
SOURCE, COVRS: NORMAL
SPECIMEN SOURCE, FCOV2M: NORMAL
SPECIMEN TYPE, XMCV1T: NORMAL

## 2021-01-12 RX ORDER — CARVEDILOL 6.25 MG/1
6.25 TABLET ORAL 2 TIMES DAILY WITH MEALS
COMMUNITY

## 2021-01-12 NOTE — PERIOP NOTES
[facility]  Ambulatory Surgery Unit  Pre-operative Instructions    Surgery/Procedure Date  Thursday, January 14, 2021            Tentative Arrival Time 0830      1. On the day of your surgery/procedure, please report to the Ambulatory Surgery Unit Registration Desk and sign in at your designated time. The Ambulatory Surgery Unit is located in [facility] on the [facility] side of the \[facility]\"" across from the [address] [place] [address]. Please have all of your health insurance cards and a photo ID. 2. You must have someone with you to drive you home, as you should not drive a car for 24 hours following anesthesia. Please make arrangements for a responsible adult friend or family member to stay with you for at least the first 24 hours after your surgery. 3. Do not have anything to eat or drink (including water, gum, mints, coffee, juice) after 11:59 PM, Wednesday. This may not apply to medications prescribed by your physician. (Please note below the special instructions with medications to take the morning of surgery, if applicable.)    4. We recommend you do not drink any alcoholic beverages for 24 hours before and after your surgery. 5. Contact your surgeons office for instructions on the following medications: non-steroidal anti-inflammatory drugs (i.e. Advil, Aleve), vitamins, and supplements. (Some surgeons will want you to stop these medications prior to surgery and others may allow you to take them)   **If you are currently taking Plavix, Coumadin, Aspirin and/or other blood-thinning agents, contact your surgeon for instructions. ** Your surgeon will partner with the physician prescribing these medications to determine if it is safe to stop or if you need to continue taking. Please do not stop taking these medications without instructions from your surgeon.     6. In an effort to help prevent surgical site infection, we ask that you shower with an anti-bacterial soap (i.e. Dial/Safeguard, or the soap provided to you at your preadmission testing appointment) for 3 days prior to and on the morning of surgery, using a fresh towel after each shower. (Please begin this process with fresh bed linens.) Do not apply any lotions, powders, or deodorants after the shower on the day of your procedure. If applicable, please do not shave the operative site for 48 hours prior to surgery. 7. Wear comfortable clothes. Wear glasses instead of contacts. Do not bring any jewelry or money (other than copays or fees as instructed). Do not wear make-up, particularly mascara, the morning of your surgery. Do not wear nail polish, particularly if you are having foot /hand surgery. Wear your hair loose or down, no ponytails, buns, karen pins or clips. All body piercings must be removed. 8. You should understand that if you do not follow these instructions your surgery may be cancelled. If your physical condition changes (i.e. fever, cold or flu) please contact your surgeon as soon as possible. 9. It is important that you be on time. If a situation occurs where you may be late, or if you have any questions or problems, please call [phone].    10. Your surgery time may be subject to change. You will receive a phone call the day prior to surgery to confirm your arrival time. 11. Pediatric patients: please bring a change of clothes, diapers, bottle/sippy cup, pacifier, etc.      Special Instructions: Take all medications and inhalers, as prescribed, on the morning of surgery with a sip of water. I understand a pre-operative phone call will be made to verify my surgery time. In the event that I am not available, I give permission for a message to be left on my answering service and/or with another person?       yes    Preop instructions reviewed  Pt verbalized understanding.      ___________________      ___________________      ________________  (Signature of Patient)          (Witness) (Date and Time)

## 2021-01-13 ENCOUNTER — ANESTHESIA EVENT (OUTPATIENT)
Dept: SURGERY | Age: 66
End: 2021-01-13
Payer: COMMERCIAL

## 2021-01-14 ENCOUNTER — ANESTHESIA (OUTPATIENT)
Dept: SURGERY | Age: 66
End: 2021-01-14
Payer: COMMERCIAL

## 2021-01-14 ENCOUNTER — HOSPITAL ENCOUNTER (OUTPATIENT)
Age: 66
Setting detail: OUTPATIENT SURGERY
Discharge: HOME | End: 2021-01-14
Attending: ORTHOPAEDIC SURGERY | Admitting: ORTHOPAEDIC SURGERY
Payer: COMMERCIAL

## 2021-01-14 VITALS
TEMPERATURE: 97.9 F | WEIGHT: 168 LBS | RESPIRATION RATE: 23 BRPM | OXYGEN SATURATION: 100 % | BODY MASS INDEX: 29.77 KG/M2 | HEART RATE: 58 BPM | SYSTOLIC BLOOD PRESSURE: 142 MMHG | HEIGHT: 63 IN | DIASTOLIC BLOOD PRESSURE: 77 MMHG

## 2021-01-14 DIAGNOSIS — G89.18 POSTOPERATIVE PAIN OF EXTREMITY: Primary | ICD-10-CM

## 2021-01-14 DIAGNOSIS — M79.609 POSTOPERATIVE PAIN OF EXTREMITY: Primary | ICD-10-CM

## 2021-01-14 PROBLEM — E10.42 DIABETIC PERIPHERAL NEUROPATHY ASSOCIATED WITH TYPE 1 DIABETES MELLITUS (HCC): Status: RESOLVED | Noted: 2020-12-16 | Resolved: 2021-01-14

## 2021-01-14 PROBLEM — E11.42 DIABETIC PERIPHERAL NEUROPATHY ASSOCIATED WITH TYPE 2 DIABETES MELLITUS (HCC): Status: RESOLVED | Noted: 2020-12-16 | Resolved: 2021-01-14

## 2021-01-14 PROCEDURE — 77030011992 HC AIRWY NASOPHGL TELE -A: Performed by: ANESTHESIOLOGY

## 2021-01-14 PROCEDURE — 77030040356 HC CORD BPLR FRCP COVD -A: Performed by: ORTHOPAEDIC SURGERY

## 2021-01-14 PROCEDURE — 77030006607 HC BLD CARPL SAFGD INLC -C: Performed by: ORTHOPAEDIC SURGERY

## 2021-01-14 PROCEDURE — 74011000250 HC RX REV CODE- 250: Performed by: NURSE ANESTHETIST, CERTIFIED REGISTERED

## 2021-01-14 PROCEDURE — 77030018836 HC SOL IRR NACL ICUM -A: Performed by: ORTHOPAEDIC SURGERY

## 2021-01-14 PROCEDURE — 77030021352 HC CBL LD SYS DISP COVD -B: Performed by: ORTHOPAEDIC SURGERY

## 2021-01-14 PROCEDURE — 76210000040 HC AMBSU PH I REC FIRST 0.5 HR: Performed by: ORTHOPAEDIC SURGERY

## 2021-01-14 PROCEDURE — 76030000000 HC AMB SURG OR TIME 0.5 TO 1: Performed by: ORTHOPAEDIC SURGERY

## 2021-01-14 PROCEDURE — 77030000032 HC CUF TRNQT ZIMM -B: Performed by: ORTHOPAEDIC SURGERY

## 2021-01-14 PROCEDURE — 76060000061 HC AMB SURG ANES 0.5 TO 1 HR: Performed by: ORTHOPAEDIC SURGERY

## 2021-01-14 PROCEDURE — 74011250637 HC RX REV CODE- 250/637: Performed by: ORTHOPAEDIC SURGERY

## 2021-01-14 PROCEDURE — 74011250636 HC RX REV CODE- 250/636: Performed by: NURSE ANESTHETIST, CERTIFIED REGISTERED

## 2021-01-14 PROCEDURE — 77030002916 HC SUT ETHLN J&J -A: Performed by: ORTHOPAEDIC SURGERY

## 2021-01-14 PROCEDURE — 76210000046 HC AMBSU PH II REC FIRST 0.5 HR: Performed by: ORTHOPAEDIC SURGERY

## 2021-01-14 PROCEDURE — 74011250636 HC RX REV CODE- 250/636: Performed by: ANESTHESIOLOGY

## 2021-01-14 PROCEDURE — 2709999900 HC NON-CHARGEABLE SUPPLY: Performed by: ORTHOPAEDIC SURGERY

## 2021-01-14 PROCEDURE — 74011000250 HC RX REV CODE- 250: Performed by: ORTHOPAEDIC SURGERY

## 2021-01-14 PROCEDURE — A4565 SLINGS: HCPCS | Performed by: ORTHOPAEDIC SURGERY

## 2021-01-14 PROCEDURE — 74011250636 HC RX REV CODE- 250/636: Performed by: ORTHOPAEDIC SURGERY

## 2021-01-14 RX ORDER — ONDANSETRON 2 MG/ML
4 INJECTION INTRAMUSCULAR; INTRAVENOUS AS NEEDED
Status: DISCONTINUED | OUTPATIENT
Start: 2021-01-14 | End: 2021-01-14 | Stop reason: HOSPADM

## 2021-01-14 RX ORDER — HYDROMORPHONE HYDROCHLORIDE 1 MG/ML
.2-.5 INJECTION, SOLUTION INTRAMUSCULAR; INTRAVENOUS; SUBCUTANEOUS
Status: DISCONTINUED | OUTPATIENT
Start: 2021-01-14 | End: 2021-01-14 | Stop reason: HOSPADM

## 2021-01-14 RX ORDER — LIDOCAINE HYDROCHLORIDE AND EPINEPHRINE 20; 5 MG/ML; UG/ML
INJECTION, SOLUTION EPIDURAL; INFILTRATION; INTRACAUDAL; PERINEURAL AS NEEDED
Status: DISCONTINUED | OUTPATIENT
Start: 2021-01-14 | End: 2021-01-14 | Stop reason: HOSPADM

## 2021-01-14 RX ORDER — SODIUM CHLORIDE, SODIUM LACTATE, POTASSIUM CHLORIDE, CALCIUM CHLORIDE 600; 310; 30; 20 MG/100ML; MG/100ML; MG/100ML; MG/100ML
25 INJECTION, SOLUTION INTRAVENOUS CONTINUOUS
Status: DISCONTINUED | OUTPATIENT
Start: 2021-01-14 | End: 2021-01-14 | Stop reason: HOSPADM

## 2021-01-14 RX ORDER — PROPOFOL 10 MG/ML
INJECTION, EMULSION INTRAVENOUS
Status: DISCONTINUED | OUTPATIENT
Start: 2021-01-14 | End: 2021-01-14 | Stop reason: HOSPADM

## 2021-01-14 RX ORDER — DEXAMETHASONE SODIUM PHOSPHATE 4 MG/ML
INJECTION, SOLUTION INTRA-ARTICULAR; INTRALESIONAL; INTRAMUSCULAR; INTRAVENOUS; SOFT TISSUE AS NEEDED
Status: DISCONTINUED | OUTPATIENT
Start: 2021-01-14 | End: 2021-01-14 | Stop reason: HOSPADM

## 2021-01-14 RX ORDER — ONDANSETRON 2 MG/ML
INJECTION INTRAMUSCULAR; INTRAVENOUS AS NEEDED
Status: DISCONTINUED | OUTPATIENT
Start: 2021-01-14 | End: 2021-01-14 | Stop reason: HOSPADM

## 2021-01-14 RX ORDER — LIDOCAINE HYDROCHLORIDE 10 MG/ML
0.1 INJECTION, SOLUTION EPIDURAL; INFILTRATION; INTRACAUDAL; PERINEURAL AS NEEDED
Status: DISCONTINUED | OUTPATIENT
Start: 2021-01-14 | End: 2021-01-14 | Stop reason: HOSPADM

## 2021-01-14 RX ORDER — DIPHENHYDRAMINE HYDROCHLORIDE 50 MG/ML
12.5 INJECTION, SOLUTION INTRAMUSCULAR; INTRAVENOUS AS NEEDED
Status: DISCONTINUED | OUTPATIENT
Start: 2021-01-14 | End: 2021-01-14 | Stop reason: HOSPADM

## 2021-01-14 RX ORDER — FENTANYL CITRATE 50 UG/ML
25 INJECTION, SOLUTION INTRAMUSCULAR; INTRAVENOUS
Status: DISCONTINUED | OUTPATIENT
Start: 2021-01-14 | End: 2021-01-14 | Stop reason: HOSPADM

## 2021-01-14 RX ORDER — LIDOCAINE HYDROCHLORIDE 20 MG/ML
INJECTION, SOLUTION EPIDURAL; INFILTRATION; INTRACAUDAL; PERINEURAL AS NEEDED
Status: DISCONTINUED | OUTPATIENT
Start: 2021-01-14 | End: 2021-01-14 | Stop reason: HOSPADM

## 2021-01-14 RX ORDER — SODIUM CHLORIDE 0.9 % (FLUSH) 0.9 %
5-40 SYRINGE (ML) INJECTION EVERY 8 HOURS
Status: DISCONTINUED | OUTPATIENT
Start: 2021-01-14 | End: 2021-01-14 | Stop reason: HOSPADM

## 2021-01-14 RX ORDER — SODIUM CHLORIDE 0.9 % (FLUSH) 0.9 %
5-40 SYRINGE (ML) INJECTION AS NEEDED
Status: DISCONTINUED | OUTPATIENT
Start: 2021-01-14 | End: 2021-01-14 | Stop reason: HOSPADM

## 2021-01-14 RX ORDER — PROPOFOL 10 MG/ML
INJECTION, EMULSION INTRAVENOUS AS NEEDED
Status: DISCONTINUED | OUTPATIENT
Start: 2021-01-14 | End: 2021-01-14 | Stop reason: HOSPADM

## 2021-01-14 RX ORDER — FENTANYL CITRATE 50 UG/ML
INJECTION, SOLUTION INTRAMUSCULAR; INTRAVENOUS AS NEEDED
Status: DISCONTINUED | OUTPATIENT
Start: 2021-01-14 | End: 2021-01-14 | Stop reason: HOSPADM

## 2021-01-14 RX ORDER — SODIUM CHLORIDE 9 MG/ML
INJECTION, SOLUTION INTRAVENOUS
Status: COMPLETED | OUTPATIENT
Start: 2021-01-14 | End: 2021-01-14

## 2021-01-14 RX ORDER — HYDROCODONE BITARTRATE AND ACETAMINOPHEN 5; 325 MG/1; MG/1
1 TABLET ORAL
Qty: 15 TAB | Refills: 0 | Status: SHIPPED | OUTPATIENT
Start: 2021-01-14 | End: 2021-01-17

## 2021-01-14 RX ADMIN — DEXAMETHASONE SODIUM PHOSPHATE 4 MG: 4 INJECTION, SOLUTION INTRAMUSCULAR; INTRAVENOUS at 10:13

## 2021-01-14 RX ADMIN — ONDANSETRON HYDROCHLORIDE 4 MG: 2 INJECTION, SOLUTION INTRAMUSCULAR; INTRAVENOUS at 10:13

## 2021-01-14 RX ADMIN — LIDOCAINE HYDROCHLORIDE 80 MG: 20 INJECTION, SOLUTION EPIDURAL; INFILTRATION; INTRACAUDAL; PERINEURAL at 10:05

## 2021-01-14 RX ADMIN — Medication 3 AMPULE: at 08:50

## 2021-01-14 RX ADMIN — PROPOFOL 75 MCG/KG/MIN: 10 INJECTION, EMULSION INTRAVENOUS at 10:06

## 2021-01-14 RX ADMIN — PROPOFOL 50 MG: 10 INJECTION, EMULSION INTRAVENOUS at 10:06

## 2021-01-14 RX ADMIN — SODIUM CHLORIDE, POTASSIUM CHLORIDE, SODIUM LACTATE AND CALCIUM CHLORIDE 25 ML/HR: 600; 310; 30; 20 INJECTION, SOLUTION INTRAVENOUS at 08:50

## 2021-01-14 RX ADMIN — FENTANYL CITRATE 100 MCG: 50 INJECTION, SOLUTION INTRAMUSCULAR; INTRAVENOUS at 09:57

## 2021-01-14 NOTE — DISCHARGE INSTRUCTIONS
Discharge Instructions for:    Jerome Gann / [id number] : 1955    Admitted 2021 Discharged: 2021       Postoperative Hand Surgery Instructions      Elevation:  Elevation is one of the most important things to do following your surgery. Not only does it decrease swelling, but it also decreases pain. For hand or wrist surgery, keep the arm in your sling while up and about, with your hand above your elbow. When lying down, keep your arm propped up on a few pillows, so that your fingers are pointing towards the ceiling. Finger Motion:  **It is very important that you begin moving your fingers immediately after surgery, as often as you can, in order to prevent stiffness. Wiggling your fingers is not the same as moving them - moving your fingers involves bending them until they touch the dressing   (if possible). You should use your other hand to gently move the fingers on the operative hand if necessary. Dressings:  Please leave the surgical dressing in place until you are seen in the office for your first post-operative appointment with Dr Shayy Bush. The dressing helps to prevent infection and positions the extremity to protect the surgical procedure  that was performed. Bathing and showering are allowed as long as the dressing is kept dry. To keep your dressing dry while bathing, simply place a plastic bag (bread bag, newspaper bag, trash bag or subway sandwich bag) over the dressing and seal it with tape or a rubber band. Medications: You have likely been given a prescription for pain medication. Please follow the instructions closely. It is safe to take up to 600mg of Ibuprofen (Advil or Motrin) along with the pain prescription as long you are not allergic to it, are not on blood thinners, and do not have gastric reflux or an ulcer. However, do not take any additional tylenol/acetaminophen if your prescription contains tylenol.       Note that my policy is to give only one prescription for pain medication at the time of the surgery - if you use it up quickly, then you will have no more pain medication left after only a few days, and I will not give you another prescription. So use your pain medication sparingly, and only when necessary! If you have new or increasing numbness after any numbing medicine wears off (12-24 hours for regional blocks, 3 hours for local), call the office immediately. If you experience nausea, vomiting or itching with the pain medication, please call the office during regular office hours. Refills for pain medication prescriptions ARE NOT given on the weekend or after regular office hours. If antibiotics have been prescribed, please be sure to complete the entire prescription. Post-Operative Appointments:  Dr. Jing Maher likes to see you back in the office about 10-14 days following your surgery to change the post-operative dressing and remove any necessary sutures or staples. If you have not received a follow up appointment card please contact our office at [phone]. *If you have any questions or concerns or experience any sudden changes in your condition, please call our office at [phone]*    DO NOT TAKE TYLENOL/ACETAMINOPHEN WITH PERCOCET, [address], [address] TAKE NARCOTIC PAIN MEDICATIONS WITH FOOD     Narcotics tend to be constipating, we suggest taking a stool softener such as Colace or Miralax (follow package instructions). DO NOT DRIVE WHILE TAKING NARCOTIC PAIN MEDICATIONS. DO NOT TAKE SLEEPING MEDICATIONS OR ANTIANXIETY MEDICATIONS WHILE TAKING NARCOTIC PAIN MEDICATIONS,  ESPECIALLY THE NIGHT OF ANESTHESIA! CPAP PATIENTS BE SURE TO WEAR MACHINE WHENEVER NAPPING OR SLEEPING! DISCHARGE SUMMARY from Nurse    The following personal items collected during your admission are returned to you:   Dental Appliance: Dental Appliances:  Other (comment)(Permanent bridges)  Vision: Visual Aid: Glasses(Glasses in PACU)  Hearing Aid:    Jewelry: Jewelry: None  Clothing: Clothing: With patient  Other Valuables: Other Valuables: Eyeglasses(Glasses in PACU)  Valuables sent to safe:        PATIENT INSTRUCTIONS:    After General Anesthesia or Intravenous Sedation, for 24 hours or while taking prescription Narcotics:        Someone should be with you for the next 24 hours. For your own safety, a responsible adult must drive you home. · Limit your activities  · Recommended activity: Rest today, up with assistance today. Do not climb stairs or shower unattended for the next 24 hours. · Please start with a soft bland diet and advance as tolerated (no nausea) to regular diet. · If you have a sore throat you should try the following: fluids, warm salt water gargles, or throat lozenges. If it does not improve after several days please follow up with your primary physician. · Do not drive and operate hazardous machinery  · Do not make important personal or business decisions  · Do  not drink alcoholic beverages  · If you have not urinated within 8 hours after discharge, please contact your surgeon on call. Report the following to your surgeon:  · Excessive pain, swelling, redness or odor of or around the surgical area  · Temperature over 100.5  · Nausea and vomiting lasting longer than 4 hours or if unable to take medications  · Any signs of decreased circulation or nerve impairment to extremity: change in color, persistent  numbness, tingling, coldness or increase pain      · You will receive a Post Operative Call from one of the Recovery Room Nurses on the day after your surgery to check on you. It is very important for us to know how you are recovering after your surgery. If you have an issue or need to speak with someone, please call your surgeon, do not wait for the post operative call. · You may receive an e-mail or letter in the mail from [place] regarding your experience with us in the Ambulatory Surgery Unit. Your feedback is valuable to us and we appreciate your participation in the survey. · If the above instructions are not adequate or you are having problems after your surgery, call the physician at their office number. · We wish you a speedy recovery ? What to do at Home:      *  Please give a list of your current medications to your Primary Care Provider. *  Please update this list whenever your medications are discontinued, doses are      changed, or new medications (including over-the-counter products) are added. *  Please carry medication information at all times in case of emergency situations. If you have not received your influenza and/or pneumococcal vaccine, please follow up with your primary care physician. The discharge information has been reviewed with the patient and caregiver. The patient and caregiver verbalized understanding. TO PREVENT AN INFECTION      1. [address] YOUR HANDS     To prevent infection, good handwashing is the most important thing you or your caregiver can do.  Wash your hands with soap and water or use the hand  we gave you before you touch any wounds. 2. SHOWER     Use the antibacterial soap we gave you when you take a shower.  Shower with this soap until your wounds are healed.  To reach all areas of your body, you may need someone to help you.  Dont forget to clean your belly button with every shower. 3.  USE CLEAN SHEETS     Use freshly cleaned sheets on your bed after surgery.  To keep the surgery site clean, do not allow pets to sleep with you while your wound is still healing. 4. STOP SMOKING     Stop smoking, or at least cut back on smoking     Smoking slows your healing. 5.  CONTROL YOUR BLOOD SUGAR     High blood sugars slow wound healing.  If you are diabetic, control your blood sugar levels before and after your surgery.

## 2021-01-14 NOTE — ANESTHESIA POSTPROCEDURE EVALUATION
Procedure(s):  RIGHT CARPAL TUNNEL RELEASE (LOCAL [facility]). MAC    Anesthesia Post Evaluation        Patient location during evaluation: PACU  Note status: Adequate. Level of consciousness: responsive to verbal stimuli and sleepy but conscious  Pain management: satisfactory to patient  Airway patency: patent  Anesthetic complications: no  Cardiovascular status: acceptable  Respiratory status: acceptable  Hydration status: acceptable  Comments: +Post-Anesthesia Evaluation and Assessment    Patient: Yvette Powell MRN: [id number]  SSN: [id number]   YOB: 1955  Age: 60 y.o. Sex: female      Cardiovascular Function/Vital Signs    BP (!) 142/77 (BP 1 Location: Left arm, BP Patient Position: At rest)   Pulse (!) 58   Temp 36.6 °C (97.9 °F)   Resp 23   Ht 5' 3\" (1.6 m)   Wt 76.2 kg (168 lb)   SpO2 100%   BMI 29.76 kg/m²     Patient is status post Procedure(s):  RIGHT CARPAL TUNNEL RELEASE (LOCAL W/MAC). Nausea/Vomiting: Controlled. Postoperative hydration reviewed and adequate. Pain:  Pain Scale 1: Numeric (0 - 10) (01/14/21 1100)  Pain Intensity 1: 0 (01/14/21 1100)   Managed. Neurological Status:   Neuro (WDL): Within Defined Limits (01/14/21 1100)   At baseline. Mental Status and Level of Consciousness: Arousable. Pulmonary Status:   O2 Device: Room air (01/14/21 1100)   Adequate oxygenation and airway patent. Complications related to anesthesia: None    Post-anesthesia assessment completed. No concerns.     Signed By: Gracia Solo MD    1/14/2021  Post anesthesia nausea and vomiting:  controlled      INITIAL Post-op Vital signs:   Vitals Value Taken Time   /77 01/14/21 1100   Temp 36.6 °C (97.9 °F) 01/14/21 1055   Pulse 58 01/14/21 1100   Resp 23 01/14/21 1100   SpO2 100 % 01/14/21 1100

## 2021-01-14 NOTE — PERIOP NOTES
Patient received to PACU, VSS. Patient awake and alert with no complaints of pain. Dressing to right hand intact. Patient tolerating liquids. 1115: Discharge instructions given. Patient and  verbalize understanding of instructions and follow up appointment. Patient and  state ready for discharge. IV removed. Sling applied. Patient discharged at this time by wheelchair with belongings,  to provide transportation home.

## 2021-01-14 NOTE — PERIOP NOTES
Permission received to review discharge instructions and discuss private health information with , Marylee Elam. Patient states that  will be with them for at least 24 hours following today's procedure. Mistral-Air warming blanket applied at this time. Set to appropriate setting that is comfortable to patient. Will continue to monitor.

## 2021-01-14 NOTE — BRIEF OP NOTE
Brief Postoperative Note    Patient: Jon Aguila  YOB: 1955  MRN: [id number]    Date of Procedure: 1/14/2021     Pre-Op Diagnosis: BILATERAL CARPAL TUNNEL RELEASE    Post-Op Diagnosis: Same as preoperative diagnosis.       Procedure(s):  RIGHT CARPAL TUNNEL RELEASE (LOCAL W/MAC)    Surgeon(s):  Meeta Montejo MD    Surgical Assistant: None    Anesthesia: MAC     Estimated Blood Loss (mL): Minimal    Complications: None    Specimens: * No specimens in log *     Implants: * No implants in log *    Drains: * No LDAs found *    Findings: cts    Electronically Signed by Jb Spears MD on 1/14/2021 at 10:52 AM

## 2021-01-14 NOTE — OP NOTES
OPERATIVE REPORT          PREOPERATIVE DIAGNOSES  1. right carpal tunnel syndrome. POSTOPERATIVE DIAGNOSES  1. right carpal tunnel syndrome. OPERATIVE PROCEDURE: right carpal tunnel release. SURGEON: Alissa Ty MD    ANESTHESIA: Local MAC anesthesia. COMPLICATIONS: None. ESTIMATED BLOOD LOSS: Minimal.    SPECIMENS:  None    IMPLANTS:  None    DESCRIPTION OF PROCEDURE: The patient brought into the operating room and  placed on the operating room table in the supine position and sedation  administered. The operative site had been identified, and appropriate preoperative antibiotic prophylaxis administered if indicated, in pre-op holding. The right upper extremity was prepped and draped in the usual  manner. After a time-out about 8 mL of 2% lidocaine with epinephrine were  injected in line with the proposed incision in the proximal palm. The limb  was then elevated, exsanguinated with an Esmarch bandage, and the  tourniquet inflated to 250 mmHg. A curved longitudinal incision was made in the proximal palm. The  subcutaneous tissues and palmar fascia were carefully divided. A mosquito  clamp was inserted underneath the ulnar-most portion of the transverse  carpal ligament. Under direct visualization a 15 blade was used to cut down  over the clamp, dividing the distal few millimeters of the TCL. A [place]  elevator was then inserted underneath the ulnar-most portion of the TCL,  followed by positioning of the I carpal tunnel release guide. The  single-use knife was then passed from distal to proximal, completely  dividing the TCL. Complete division was confirmed using right angle  retraction and loop magnification. The wound was thoroughly irrigated. Closure was performed with 4-0 nylon suture. Xeroform, 4 x 4's Kerlix and Coban  were used as dressing. The tourniquet was released. Total tourniquet time  was about 20 minutes.  The patient tolerated the procedure well, and was taken  back to the PACU in stable condition.           Osito Quiles MD

## 2021-01-14 NOTE — ANESTHESIA PREPROCEDURE EVALUATION
Relevant Problems   No relevant active problems       Anesthetic History   No history of anesthetic complications            Review of Systems / Medical History  Patient summary reviewed, nursing notes reviewed and pertinent labs reviewed    Pulmonary  Within defined limits        Undiagnosed apnea         Neuro/Psych         Headaches     Cardiovascular    Hypertension          Past MI, CAD and cardiac stents    Exercise tolerance: >4 METS  Comments: 2019 normal echo/EF60%    Seen by cardiology in summer 2020 with no new changes and pt denies CP/SOB    GI/Hepatic/Renal  Within defined limits              Endo/Other    Diabetes         Other Findings   Comments: Gout    Cerebral aneurysm without rupture  Migraine with aura and without status migrainosus, not intractable  Bilateral carotid artery stenosis  Cerebral microvascular disease  Lumbar back pain with radiculopathy affecting right lower extremity  Lumbar back pain with radiculopathy affecting left lower extremity             Physical Exam    Airway  Mallampati: III  TM Distance: 4 - 6 cm  Neck ROM: normal range of motion   Mouth opening: Normal     Cardiovascular  Regular rate and rhythm,  S1 and S2 normal,  no murmur, click, rub, or gallop             Dental    Dentition: Poor dentition     Pulmonary  Breath sounds clear to auscultation               Abdominal  GI exam deferred       Other Findings            Anesthetic Plan    ASA: 3  Anesthesia type: MAC            Anesthetic plan and risks discussed with: Patient

## 2021-01-14 NOTE — PERIOP NOTES
Raad August  1955  [id number]    Situation:  Verbal report given from: MARTIN Antonio RN and TRENTON Soto, CRNA  Procedure: Procedure(s):  RIGHT CARPAL TUNNEL RELEASE ([facility])    Background:    Preoperative diagnosis: BILATERAL CARPAL TUNNEL RELEASE    Postoperative diagnosis: BILATERAL CARPAL TUNNEL RELEASE    :  Dr. Huong Jauregui    Assistant(s): Circ-1: Shay Yeh  Scrub Tech-1: Carmella RINCON RT    Specimens: * No specimens in log *    Assessment:  Intra-procedure medications         Anesthesia gave intra-procedure sedation and medications, see anesthesia flow sheet     Intravenous fluids: LR@ KVO     Vital signs stable       Recommendation:    Permission to share finding with  Poli Lopez : yes

## 2021-01-15 ENCOUNTER — TELEPHONE (OUTPATIENT)
Dept: NEUROLOGY | Age: 66
End: 2021-01-15

## 2021-01-15 NOTE — TELEPHONE ENCOUNTER
----- Message from Diane Nogueira sent at 1/15/2021  9:17 AM EST -----  Regarding: Dr. Sanya Dennis Message/Vendor Calls    Caller's first and last name: Pt      Reason for call: upcoming appt      Callback required yes/no and why: Yes      Best contact number(s): [phone]      Details to clarify the request: Pt is scheduled for a procedure on 01/27 with Dr. Javi Escalante. Pt was not aware of this appt at all. She would like to speak w nurse. Please advise.       Diane Nogueira

## 2021-01-17 NOTE — TELEPHONE ENCOUNTER
Returned patients call apologized to her for no one contacting her letting her know what the appointment was for she was advised that she was referred by Dr. Amelia Schaefer for a skin biopsy patient was apprective of call and verbalized understanding.

## 2021-01-27 ENCOUNTER — OFFICE VISIT (OUTPATIENT)
Dept: NEUROLOGY | Age: 66
End: 2021-01-27
Payer: COMMERCIAL

## 2021-01-27 VITALS
TEMPERATURE: 97.3 F | HEART RATE: 63 BPM | OXYGEN SATURATION: 97 % | SYSTOLIC BLOOD PRESSURE: 148 MMHG | RESPIRATION RATE: 16 BRPM | DIASTOLIC BLOOD PRESSURE: 80 MMHG

## 2021-01-27 DIAGNOSIS — G62.9 SMALL FIBER NEUROPATHY: Primary | ICD-10-CM

## 2021-01-27 PROCEDURE — 11104 PUNCH BX SKIN SINGLE LESION: CPT | Performed by: PSYCHIATRY & NEUROLOGY

## 2021-01-27 PROCEDURE — 11105 PUNCH BX SKIN EA SEP/ADDL: CPT | Performed by: PSYCHIATRY & NEUROLOGY

## 2021-01-28 NOTE — PROCEDURES
Description of the procedure    · The rational, technique and medications to be administered were discussed in detail and all questions were answered. · Witnessed Informed, written and signed consent was obtained for the procedure. · A 1 cm [place] around each biopsy site was made . Skin was cleaned with alcohol then anaesthetized skin with 2% lidocaine with epinephrine by injecting the edge of the [place], infiltrating but avoiding direct contact with the biopsy site. · 3 mm circular punch tool was used to obtain biopsy utilizing a rotational motion to push the tool to a depth of 4 mm. · Each sample was inserted into a sterile vial containing fixating solution and sealed. · A sterile bandaid was placed on each site and detailed instructions on wound care was provided verbally and in written form.     Sites sampled:  Lateral aspect of distal right and left legs  Lateral aspect of proximal right  arm  Blood loss: Less than 1cc

## 2021-02-24 ENCOUNTER — TELEPHONE (OUTPATIENT)
Dept: NEUROLOGY | Age: 66
End: 2021-02-24

## 2021-05-21 ENCOUNTER — OFFICE VISIT (OUTPATIENT)
Dept: NEUROLOGY | Age: 66
End: 2021-05-21
Payer: COMMERCIAL

## 2021-05-21 VITALS
RESPIRATION RATE: 16 BRPM | OXYGEN SATURATION: 96 % | HEIGHT: 63 IN | DIASTOLIC BLOOD PRESSURE: 84 MMHG | BODY MASS INDEX: 30.26 KG/M2 | WEIGHT: 170.8 LBS | SYSTOLIC BLOOD PRESSURE: 124 MMHG | HEART RATE: 67 BPM

## 2021-05-21 DIAGNOSIS — G60.9 IDIOPATHIC SMALL FIBER PERIPHERAL NEUROPATHY: ICD-10-CM

## 2021-05-21 DIAGNOSIS — G43.109 MIGRAINE WITH AURA AND WITHOUT STATUS MIGRAINOSUS, NOT INTRACTABLE: ICD-10-CM

## 2021-05-21 DIAGNOSIS — E03.4 HYPOTHYROIDISM DUE TO ACQUIRED ATROPHY OF THYROID: ICD-10-CM

## 2021-05-21 DIAGNOSIS — E11.42 DIABETIC PERIPHERAL NEUROPATHY ASSOCIATED WITH TYPE 2 DIABETES MELLITUS (HCC): ICD-10-CM

## 2021-05-21 DIAGNOSIS — E55.9 VITAMIN D DEFICIENCY: ICD-10-CM

## 2021-05-21 DIAGNOSIS — E53.8 B12 DEFICIENCY: Primary | ICD-10-CM

## 2021-05-21 DIAGNOSIS — G56.03 BILATERAL CARPAL TUNNEL SYNDROME: ICD-10-CM

## 2021-05-21 PROCEDURE — 99214 OFFICE O/P EST MOD 30 MIN: CPT | Performed by: PSYCHIATRY & NEUROLOGY

## 2021-05-21 RX ORDER — MULTIVIT WITH MINERALS/HERBS
1 TABLET ORAL DAILY
COMMUNITY

## 2021-05-21 RX ORDER — CHOLECALCIFEROL (VITAMIN D3) 125 MCG
2000 CAPSULE ORAL
COMMUNITY

## 2021-05-21 NOTE — PROGRESS NOTES
Consult  REFERRED BY:  German Gary MD    CHIEF COMPLAINT: History of migraine headaches, and history of possible aneurysm      Subjective: Magdalena Samson is a 60 y.o. right-handed  female, seen at the request of Dr. Abrahan Morgan of new problem of patient having a recent skin biopsy for nerve fiber density looking for small fiber neuropathy that was positive at 2 of the 3 sites, supporting her diagnosis of small fiber neuropathy as a cause of her sensory symptoms in her feet and possibly some in the hands. Her work-up had included a low B12 level and low vitamin D level and borderline hemoglobin A1c of 5.6. There were done 6 months ago. She has been taking supplements so we will repeat her levels to make sure she is back in the therapeutic range and does not have pernicious anemia and need injections of B12. Her EMG study did show the bilateral carpal tunnel syndromes, right greater than left and she had hand surgery done with Dr. Leopold Martin and feels much better with that. Her EMG study did not show any clear evidence of neuropathy suggesting small fiber neuropathy, and her skin biopsy did indicate that. Patient had a question of an aneurysm followed for 2 years, but the last MRA scan showed it was more likely an infundibulum than an aneurysm told that it really seems stable we will probably repeat that in another year or 2 just to be sure. The patient had a paternal grandmother  of a cerebral hemorrhage from cerebral aneurysm, and patient's brother dying of a cerebral hemorrhage and aneurysm recently, and patient having a past history of possible aneurysm found 2016 on an MRA of the brain. It was in the right posterior cerebral P1 branch, and was described as either a possible aneurysm or infundibulum.   Patient also has new problem of visual scotomata in her peripheral visual field at times but not associated with headaches that only lasts about 10 to 15 minutes and I told her though most likely will be migraine auras because the auras persist but not always the headache as we get older. Her scans of her head have not shown any other vascular or structural lesion of the brain to explain it. She does have migraine headaches, but no major increased recently. Her aneurysm size is measured at 2 mm 4 years ago. Otherwise the MRI scan was normal.  She had no other focal weakness, sensory loss, head trauma, fever, meningismus, or any other focal neurological deficit, no cognitive impairment and no cranial nerve problems. Her past medical history pertinent for kidney stones, essential hypertension, skin cancer on her nose, and coronary artery disease. He advised the patient that the single most important thing she can do to prevent any enlargement or aneurysm or complication from the aneurysm is control her blood pressure. We will proceed with an MRA of the brain, and we may need to do a CTA as recommended last time possibly. Patient also complains of some intermittent numbness in her hands when she uses them, and when she sleeps at night, sounds like carpal tunnel, and she complains of numbness radiating down the sides of her legs bilaterally, and tingling, that seem to be getting worse, and she has some mild back discomfort, so we will schedule her for an EMG of her arms and legs to rule out carpal tunnel syndrome and rule out lumbar radiculopathy.     Past Medical History:   Diagnosis Date    At risk for sleep apnea     during phone assessment with PAT    Basal cell carcinoma (BCC)     and squamous cell on nose, treated with chemo cream    CAD (coronary artery disease) 09/03/2019    stent and NSTEMI    Cataracts, bilateral     Chronic kidney disease     stage iv, after heart attack and two angio's back to back, back to regular function per pt, [place]    Gout     Hypertension     Kidney stone     Tinnitus aurium, bilateral       Past Surgical History:   Procedure Laterality Date    HX APPENDECTOMY      HX BREAST BIOPSY Right 2021    HX  SECTION      x3    HX HEART CATHETERIZATION  2019, 7/2020    x4 stents placed in 2019    HX HEENT      Tonsilectomy    HX HYSTERECTOMY      HX LITHOTRIPSY      bunches per pt    HX MOHS PROCEDURES      nose    HX OTHER SURGICAL Right     bunionectomy     Family History   Problem Relation Age of Onset    Cancer Mother     Diabetes Mother     Heart Disease Mother     Hypertension Mother     Kidney Disease Mother     Diabetes Father     Kidney Disease Father         had transplant    Heart Disease Father     Other Brother         cerebral aneurysm    Other Maternal Grandmother         cerebral aneurysm    Sleep Apnea Son       Social History     Tobacco Use    Smoking status: Former Smoker     Packs/day: 0.50     Years: 30.00     Pack years: 15.00     Quit date: 2019     Years since quittin.7    Smokeless tobacco: Never Used   Substance Use Topics    Alcohol use: Yes     Comment: once or twice a month         Current Outpatient Medications:     b complex vitamins tablet, Take 1 Tablet by mouth daily. , Disp: , Rfl:     cholecalciferol, vitamin D3, (Vitamin D3) 50 mcg (2,000 unit) tab, Take 2,000 Units by mouth., Disp: , Rfl:     carvediloL (COREG) 6.25 mg tablet, Take 6.25 mg by mouth two (2) times daily (with meals). , Disp: , Rfl:     rosuvastatin (CRESTOR) 20 mg tablet, Take 1 Tab by mouth nightly., Disp: 60 Tab, Rfl: 3    aspirin 81 mg chewable tablet, Take 1 Tab by mouth daily. , Disp: 60 Tab, Rfl: 3    losartan (COZAAR) 100 mg tablet, Take 1 Tab by mouth daily (with breakfast). , Disp: 60 Tab, Rfl: 3        Allergies   Allergen Reactions    Morphine Rash    Pcn [Penicillins] Unknown (comments)     Mother and grandfather were severely allergic to it, pt has never had    Renografin-60 [Diatrizoate Christine-Diatrizoat Sod] Angioedema      MRI Results (most recent):  [place] [facility] [place]    MRA BRAIN WO CONT    Narrative  INDICATION:  ? R P1 branch aneurysm vs infundibulum    MRA HEAD w/o contrast.    COMPARISON: 4/20/2016. TECHNIQUE: Unenhanced 3-D time-of-flight MRA of the brain was obtained. Multiplanar reconstructions were performed. FINDINGS:  There is stable 2 mm protrusion from the posterior right P1, see series 4 image  97, with appearance more suggestive of vessel infundibulum than aneurysm. The exam is stable with no developing aneurysm. There is no major vessel  occlusion or significant irregularity or stenosis. Cerebral arteries are  symmetric in size and distribution. Cahto of [place] is unremarkable. Basilar  artery is unremarkable. There is no apparent arterial vascular malformation. Impression  IMPRESSION: No developing aneurysm. Stable exam including 2 mm protrusion from  the posterior right P1 favoring infundibulum over aneurysm. Results from [place] encounter on 12/04/20    MRA BRAIN WO CONT    Narrative  INDICATION:  ? R P1 branch aneurysm vs infundibulum    MRA HEAD w/o contrast.    COMPARISON: 4/20/2016. TECHNIQUE: Unenhanced 3-D time-of-flight MRA of the brain was obtained. Multiplanar reconstructions were performed. FINDINGS:  There is stable 2 mm protrusion from the posterior right P1, see series 4 image  97, with appearance more suggestive of vessel infundibulum than aneurysm. The exam is stable with no developing aneurysm. There is no major vessel  occlusion or significant irregularity or stenosis. Cerebral arteries are  symmetric in size and distribution. Cahto of [place] is unremarkable. Basilar  artery is unremarkable. There is no apparent arterial vascular malformation. Impression  IMPRESSION: No developing aneurysm. Stable exam including 2 mm protrusion from  the posterior right P1 favoring infundibulum over aneurysm.     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 Mental Status: Oriented to time, place and person, and the president, cognitive function is normal and speech is fluent and no aphasia or dysarthria. Mood and affect appropriate. Cranial Nerves:   Intact visual fields. Fundi are benign, disc are flat, no lesions seen on funduscopy. EDWIN, EOM's full, no nystagmus, no ptosis. Facial sensation is normal. Corneal reflexes are not tested. Facial movement is symmetric. Hearing is normal bilaterally. Palate is midline with normal sternocleidomastoid and trapezius muscles are normal. Tongue is midline. Neck without meningismus or bruits  Temporal arteries are not tender or enlarged  TMJ areas are not tender on palpation   Motor:  5/5 strength in upper and lower proximal and distal muscles. Normal bulk and tone. No fasciculations. Rapid alternating movement is symmetric and intact bilaterally   Reflexes:   Deep tendon reflexes 2+/4 and symmetrical.  No babinski or clonus present   Sensory:   Normal to touch, pinprick and vibration and temperature. DSS is intact   Gait:  Normal gait for patient's age. Tremor:   No tremor noted. Cerebellar:  No abnormal cerebellar signs present on Romberg and tandem testing and finger-nose-finger exam.   Neurovascular:  Normal heart sounds and regular rhythm, peripheral pulses intact, and no carotid bruits. Assessment:       ICD-10-CM ICD-9-CM    1. B12 deficiency  E53.8 266.2 VITAMIN B12 & FOLATE      VITAMIN B12 & FOLATE   2. Vitamin D deficiency  E55.9 268.9 VITAMIN D, 25 HYDROXY      VITAMIN D, 25 HYDROXY   3.  Hypothyroidism due to acquired atrophy of thyroid  E03.4 244.8 TSH 3RD GENERATION     246.8 T4 (THYROXINE)      T3 TOTAL      TSH 3RD GENERATION      T4 (THYROXINE)      T3 TOTAL   4. Idiopathic small fiber peripheral neuropathy  G60.9 356.9 VITAMIN D, 25 HYDROXY      VITAMIN B12 & FOLATE      TSH 3RD GENERATION      T4 (THYROXINE)      T3 TOTAL      VITAMIN D, 25 HYDROXY      VITAMIN B12 & FOLATE      TSH 3RD GENERATION      T4 (THYROXINE)      T3 TOTAL   5. Bilateral carpal tunnel syndrome  G56.03 354.0 VITAMIN D, 25 HYDROXY      VITAMIN B12 & FOLATE      TSH 3RD GENERATION      T4 (THYROXINE)      T3 TOTAL      VITAMIN D, 25 HYDROXY      VITAMIN B12 & FOLATE      TSH 3RD GENERATION      T4 (THYROXINE)      T3 TOTAL   6. Migraine with aura and without status migrainosus, not intractable  G43.109 346.00 VITAMIN D, 25 HYDROXY      VITAMIN B12 & FOLATE      TSH 3RD GENERATION      T4 (THYROXINE)      T3 TOTAL      VITAMIN D, 25 HYDROXY      VITAMIN B12 & FOLATE      TSH 3RD GENERATION      T4 (THYROXINE)      T3 TOTAL   7. Diabetic peripheral neuropathy associated with type 2 diabetes mellitus (HCC)  E11.42 250.60 HEMOGLOBIN A1C WITH EAG     357.2 HEMOGLOBIN A1C WITH EAG     Active Problems:    * No active hospital problems. *      Plan:     New problem of small fiber neuropathy found on skin biopsy, and we will recheck some of her lab studies to make sure that her vitamin D level is back to normal and her B12 level, and that her hemoglobin A1c has not gone elevated more than 5.6. We told her to continue the vitamin D and the vitamin B12 and a One-A-Day vitamin and watch her blood sugars carefully as a single best treatment of this neuropathy. The neuropathy does not really bother her that much other than she touches her right leg she has a little bit of painful paresthesias along the side of the leg but it does not stop her from sleeping at night or cause her discomfort or interfere with her ability to do her daily functions.   Because of that reason we will not give her any new medication. Her right hand is doing much better after carpal tunnel syndrome she is stable from that standpoint and does not have any weakness has normal sensation of the hand and is doing very well. We will follow that. Because of her aneurysm history she had last MRA that showed infundibulum was more likely an aneurysm so again we will keep following that in a year or 2. Because of her family history of aneurysms, the patient will get a MRA repeat, and because of her age of 60 and hypertension, we will check a carotid Doppler study to make sure there is no carotid stenosis or other flow abnormalities. Her Doppler study last time was unremarkable. Patient will check results on my chart, or call us for results when done. She is encouraged to make sure that she controls her blood pressure well, as the main risk factor for aneurysm growth. Patient's back pain is somewhat better with exercises and her EMG did not show any clear signs of a lumbar motor radiculopathy, we will continue conservative management of that. Follow-up in 6 months time or earlier if need be if there is only a small aneurysm, and she may need referral to interventional neuroradiology should she have any enlargement or growth of the aneurysm. We will follow closely, 38 minutes spent with the patient, over history, reviewing her films personally on the PACS system, and discussing her diagnosis prognosis and treatment options.     Signed By: Dickson Martinez MD     May 21, 2021       CC: Jesi Arreola MD  FAX: [phone]

## 2021-05-21 NOTE — LETTER
5/21/2021 Patient: Jb Fagan YOB: 1955 Date of Visit: 5/21/2021 Donna Norris MD 
[address] [address] Via Fax: [phone] Dear Donna Norris MD, Thank you for referring Ms. Jb Fagan to [address] [place] [address] for evaluation. My notes for this consultation are attached. Chief Complaint Patient presents with  Follow-up  
  follow up from mri and main thing is her neuropathy and it is not better. aura in the left eye.  back clicks when she moves right at the bra line Consult REFERRED BY: 
Naomie Choi MD 
 
CHIEF COMPLAINT: History of migraine headaches, and history of possible aneurysm Subjective: Jb Fagan is a 60 y.o. right-handed  female, seen at the request of Dr. Alfredito Kirkpatrick of new problem of patient having a recent skin biopsy for nerve fiber density looking for small fiber neuropathy that was positive at 2 of the 3 sites, supporting her diagnosis of small fiber neuropathy as a cause of her sensory symptoms in her feet and possibly some in the hands. Her work-up had included a low B12 level and low vitamin D level and borderline hemoglobin A1c of 5.6. There were done 6 months ago. She has been taking supplements so we will repeat her levels to make sure she is back in the therapeutic range and does not have pernicious anemia and need injections of B12. Her EMG study did show the bilateral carpal tunnel syndromes, right greater than left and she had hand surgery done with Dr. Elier Martin and feels much better with that. Her EMG study did not show any clear evidence of neuropathy suggesting small fiber neuropathy, and her skin biopsy did indicate that. Patient had a question of an aneurysm followed for 2 years, but the last MRA scan showed it was more likely an infundibulum than an aneurysm told that it really seems stable we will probably repeat that in another year or 2 just to be sure. The patient had a paternal grandmother  of a cerebral hemorrhage from cerebral aneurysm, and patient's brother dying of a cerebral hemorrhage and aneurysm recently, and patient having a past history of possible aneurysm found 2016 on an MRA of the brain. It was in the right posterior cerebral P1 branch, and was described as either a possible aneurysm or infundibulum. Patient also has new problem of visual scotomata in her peripheral visual field at times but not associated with headaches that only lasts about 10 to 15 minutes and I told her though most likely will be migraine auras because the auras persist but not always the headache as we get older. Her scans of her head have not shown any other vascular or structural lesion of the brain to explain it. She does have migraine headaches, but no major increased recently. Her aneurysm size is measured at 2 mm 4 years ago. Otherwise the MRI scan was normal.  She had no other focal weakness, sensory loss, head trauma, fever, meningismus, or any other focal neurological deficit, no cognitive impairment and no cranial nerve problems. Her past medical history pertinent for kidney stones, essential hypertension, skin cancer on her nose, and coronary artery disease. He advised the patient that the single most important thing she can do to prevent any enlargement or aneurysm or complication from the aneurysm is control her blood pressure. We will proceed with an MRA of the brain, and we may need to do a CTA as recommended last time possibly.  
Patient also complains of some intermittent numbness in her hands when she uses them, and when she sleeps at night, sounds like carpal tunnel, and she complains of numbness radiating down the sides of her legs bilaterally, and tingling, that seem to be getting worse, and she has some mild back discomfort, so we will schedule her for an EMG of her arms and legs to rule out carpal tunnel syndrome and rule out lumbar radiculopathy. Past Medical History:  
Diagnosis Date  At risk for sleep apnea   
 during phone assessment with PAT  Basal cell carcinoma (BCC)   
 and squamous cell on nose, treated with chemo cream  
 CAD (coronary artery disease) 2019  
 stent and NSTEMI  Cataracts, bilateral   
 Chronic kidney disease   
 stage iv, after heart attack and two angio's back to back, back to regular function per pt, Pauline [place]  Gout  Hypertension  Kidney stone  Tinnitus aurium, bilateral   
  
Past Surgical History:  
Procedure Laterality Date  HX APPENDECTOMY  HX BREAST BIOPSY Right 2021  HX  SECTION    
 x3  
 HX HEART CATHETERIZATION  , 7/2020  
 x4 stents placed in 2019  HX HEENT Tonsilectomy  HX HYSTERECTOMY  HX LITHOTRIPSY    
 bunches per pt  HX MOHS PROCEDURES    
 nose  HX OTHER SURGICAL Right   
 bunionectomy Family History Problem Relation Age of Onset  Cancer Mother  Diabetes Mother  Heart Disease Mother  Hypertension Mother  Kidney Disease Mother  Diabetes Father  Kidney Disease Father   
     had transplant  Heart Disease Father  Other Brother   
     cerebral aneurysm  Other Maternal Grandmother   
     cerebral aneurysm  Sleep Apnea Son   
  
Social History Tobacco Use  Smoking status: Former Smoker Packs/day: 0.50 Years: 30.00 Pack years: 15.00 Quit date: 2019 Years since quittin.7  Smokeless tobacco: Never Used Substance Use Topics  Alcohol use: Yes Comment: once or twice a month Current Outpatient Medications:  
  b complex vitamins tablet, Take 1 Tablet by mouth daily. , Disp: , Rfl:  
  cholecalciferol, vitamin D3, (Vitamin D3) 50 mcg (2,000 unit) tab, Take 2,000 Units by mouth., Disp: , Rfl:  
  carvediloL (COREG) 6.25 mg tablet, Take 6.25 mg by mouth two (2) times daily (with meals). , Disp: , Rfl:  
  rosuvastatin (CRESTOR) 20 mg tablet, Take 1 Tab by mouth nightly., Disp: 60 Tab, Rfl: 3 
  aspirin 81 mg chewable tablet, Take 1 Tab by mouth daily. , Disp: 60 Tab, Rfl: 3 
  losartan (COZAAR) 100 mg tablet, Take 1 Tab by mouth daily (with breakfast). , Disp: 60 Tab, Rfl: 3 Allergies Allergen Reactions  Morphine Rash  Pcn [Penicillins] Unknown (comments) Mother and grandfather were severely allergic to it, pt has never had  Renografin-60 [Diatrizoate Christine-Diatrizoat Sod] Angioedema MRI Results (most recent): 
Results from Hospital Encounter encounter on 12/04/20 MRA BRAIN WO CONT Narrative INDICATION:  ? R P1 branch aneurysm vs infundibulum MRA HEAD w/o contrast. 
 
COMPARISON: 4/20/2016. TECHNIQUE: Unenhanced 3-D time-of-flight MRA of the brain was obtained. Multiplanar reconstructions were performed. FINDINGS: 
There is stable 2 mm protrusion from the posterior right P1, see series 4 image 97, with appearance more suggestive of vessel infundibulum than aneurysm. The exam is stable with no developing aneurysm. There is no major vessel 
occlusion or significant irregularity or stenosis. Cerebral arteries are 
symmetric in size and distribution. [place] is unremarkable. Basilar 
artery is unremarkable. There is no apparent arterial vascular malformation. Impression IMPRESSION: No developing aneurysm. Stable exam including 2 mm protrusion from 
the posterior right P1 favoring infundibulum over aneurysm. [place] [facility] [place]20 MRA BRAIN WO CONT Narrative INDICATION:  ? R P1 branch aneurysm vs infundibulum MRA HEAD w/o contrast. 
 
COMPARISON: 4/20/2016. TECHNIQUE: Unenhanced 3-D time-of-flight MRA of the brain was obtained. Multiplanar reconstructions were performed. FINDINGS: 
There is stable 2 mm protrusion from the posterior right P1, see series 4 image 97, with appearance more suggestive of vessel infundibulum than aneurysm.  
 
The exam is stable with no developing aneurysm. There is no major vessel 
occlusion or significant irregularity or stenosis. Cerebral arteries are 
symmetric in size and distribution. [place] is unremarkable. Basilar 
artery is unremarkable. There is no apparent arterial vascular malformation. Impression IMPRESSION: No developing aneurysm. Stable exam including 2 mm protrusion from 
the posterior right P1 favoring infundibulum over aneurysm. 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  
 05/21/21 1349 BP: 124/84 Pulse: 67 Resp: 16 SpO2: 96% Weight: 170 lb 12.8 oz (77.5 kg) Height: 5' 3\" (1.6 m) Objective: I 
 
 
NEUROLOGICAL EXAM: 
 
Appearance: The patient is well developed, well nourished, provides a coherent history and is in no acute distress. Mental Status: Oriented to time, place and person, and the president, cognitive function is normal and speech is fluent and no aphasia or dysarthria. Mood and affect appropriate. Cranial Nerves:   Intact visual fields. Fundi are benign, disc are flat, no lesions seen on funduscopy. EDWIN, EOM's full, no nystagmus, no ptosis. Facial sensation is normal. Corneal reflexes are not tested. Facial movement is symmetric. Hearing is normal bilaterally. Palate is midline with normal sternocleidomastoid and trapezius muscles are normal. Tongue is midline. Neck without meningismus or bruits Temporal arteries are not tender or enlarged TMJ areas are not tender on palpation Motor:  5/5 strength in upper and lower proximal and distal muscles. Normal bulk and tone. No fasciculations. Rapid alternating movement is symmetric and intact bilaterally Reflexes:   Deep tendon reflexes 2+/4 and symmetrical. 
No babinski or clonus present Sensory:   Normal to touch, pinprick and vibration and temperature. DSS is intact Gait:  Normal gait for patient's age. Tremor:   No tremor noted. Cerebellar:  No abnormal cerebellar signs present on Romberg and tandem testing and finger-nose-finger exam.  
Neurovascular:  Normal heart sounds and regular rhythm, peripheral pulses intact, and no carotid bruits. Assessment: ICD-10-CM ICD-9-CM 1. B12 deficiency  E53.8 266.2 VITAMIN B12 & FOLATE  
   VITAMIN B12 & FOLATE 2. Vitamin D deficiency  E55.9 268.9 VITAMIN D, 25 HYDROXY  
   VITAMIN D, 25 HYDROXY 3. Hypothyroidism due to acquired atrophy of thyroid  E03.4 244.8 TSH 3RD GENERATION  
  246.8 T4 (THYROXINE) T3 TOTAL  
   TSH 3RD GENERATION T4 (THYROXINE) T3 TOTAL 4. Idiopathic small fiber peripheral neuropathy  G60.9 356.9 VITAMIN D, 25 HYDROXY  
   VITAMIN B12 & FOLATE  
   TSH 3RD GENERATION T4 (THYROXINE) T3 TOTAL  
   VITAMIN D, 25 HYDROXY  
   VITAMIN B12 & FOLATE  
   TSH 3RD GENERATION T4 (THYROXINE) T3 TOTAL 5. Bilateral carpal tunnel syndrome  G56.03 354.0 VITAMIN D, 25 HYDROXY  
   VITAMIN B12 & FOLATE  
   TSH 3RD GENERATION T4 (THYROXINE) T3 TOTAL  
   VITAMIN D, 25 HYDROXY  
   VITAMIN B12 & FOLATE  
   TSH 3RD GENERATION T4 (THYROXINE) T3 TOTAL 6. Migraine with aura and without status migrainosus, not intractable  G43.109 346.00 VITAMIN D, 25 HYDROXY  
   VITAMIN B12 & FOLATE  
   TSH 3RD GENERATION T4 (THYROXINE) T3 TOTAL  
   VITAMIN D, 25 HYDROXY  
   VITAMIN B12 & FOLATE  
   TSH 3RD GENERATION T4 (THYROXINE) T3 TOTAL 7. Diabetic peripheral neuropathy associated with type 2 diabetes mellitus (HCC)  E11.42 250.60 HEMOGLOBIN A1C WITH EAG  
  357.2 HEMOGLOBIN A1C WITH EAG Active Problems: * No active hospital problems.  * 
 
 
Plan:  
 
New problem of small fiber neuropathy found on skin biopsy, and we will recheck some of her lab studies to make sure that her vitamin D level is back to normal and her B12 level, and that her hemoglobin A1c has not gone elevated more than 5.6. We told her to continue the vitamin D and the vitamin B12 and a One-A-Day vitamin and watch her blood sugars carefully as a single best treatment of this neuropathy. The neuropathy does not really bother her that much other than she touches her right leg she has a little bit of painful paresthesias along the side of the leg but it does not stop her from sleeping at night or cause her discomfort or interfere with her ability to do her daily functions. Because of that reason we will not give her any new medication. Her right hand is doing much better after carpal tunnel syndrome she is stable from that standpoint and does not have any weakness has normal sensation of the hand and is doing very well. We will follow that. Because of her aneurysm history she had last MRA that showed infundibulum was more likely an aneurysm so again we will keep following that in a year or 2. Because of her family history of aneurysms, the patient will get a MRA repeat, and because of her age of 60 and hypertension, we will check a carotid Doppler study to make sure there is no carotid stenosis or other flow abnormalities. Her Doppler study last time was unremarkable. Patient will check results on my chart, or call us for results when done. She is encouraged to make sure that she controls her blood pressure well, as the main risk factor for aneurysm growth. Patient's back pain is somewhat better with exercises and her EMG did not show any clear signs of a lumbar motor radiculopathy, we will continue conservative management of that. Follow-up in 6 months time or earlier if need be if there is only a small aneurysm, and she may need referral to interventional neuroradiology should she have any enlargement or growth of the aneurysm.  
We will follow closely, 38 minutes spent with the patient, over history, reviewing her films personally on the PACS system, and discussing her diagnosis prognosis and treatment options. Signed By: Dickson Martinez MD   
 May 21, 2021 CC: Jesi Arreola MD 
FAX: [phone] If you have questions, please do not hesitate to call me. I look forward to following your patient along with you. Sincerely, Dickson Martinez MD

## 2021-05-21 NOTE — PROGRESS NOTES
Chief Complaint   Patient presents with    Follow-up     follow up from mri and main thing is her neuropathy and it is not better.  aura in the left eye.  back clicks when she moves right at the bra line

## 2021-05-22 LAB
25(OH)D3+25(OH)D2 SERPL-MCNC: 34.4 NG/ML (ref 30–100)
FOLATE SERPL-MCNC: 5.1 NG/ML
HBA1C MFR BLD: 5.7 % (ref 4.8–5.6)
T3 SERPL-MCNC: 125 NG/DL (ref 71–180)
T4 SERPL-MCNC: 8 UG/DL (ref 4.5–12)
TSH SERPL DL<=0.005 MIU/L-ACNC: 1.65 UIU/ML (ref 0.45–4.5)
VIT B12 SERPL-MCNC: 1236 PG/ML (ref 232–1245)

## 2021-09-23 ENCOUNTER — TRANSCRIBE ORDER (OUTPATIENT)
Dept: SCHEDULING | Age: 66
End: 2021-09-23

## 2021-09-23 DIAGNOSIS — R22.1 MASS OF RIGHT SIDE OF NECK: Primary | ICD-10-CM

## 2021-09-29 ENCOUNTER — HOSPITAL ENCOUNTER (OUTPATIENT)
Dept: ULTRASOUND IMAGING | Age: 66
Discharge: HOME | End: 2021-09-29
Payer: COMMERCIAL

## 2021-09-29 ENCOUNTER — HOSPITAL ENCOUNTER (OUTPATIENT)
Dept: VASCULAR SURGERY | Age: 66
Discharge: HOME | End: 2021-09-29
Payer: COMMERCIAL

## 2021-09-29 DIAGNOSIS — R22.1 MASS OF RIGHT SIDE OF NECK: ICD-10-CM

## 2021-09-29 PROCEDURE — 76536 US EXAM OF HEAD AND NECK: CPT

## 2021-09-29 PROCEDURE — 93880 EXTRACRANIAL BILAT STUDY: CPT

## 2021-10-03 LAB
LEFT CCA DIST DIAS: 20.4 CM/S
LEFT CCA DIST SYS: 66.6 CM/S
LEFT CCA PROX DIAS: 25.2 CM/S
LEFT CCA PROX SYS: 106 CM/S
LEFT ECA DIAS: 12.76 CM/S
LEFT ECA SYS: 98.4 CM/S
LEFT ICA DIST DIAS: 33.3 CM/S
LEFT ICA DIST SYS: 109.2 CM/S
LEFT ICA MID DIAS: 31.7 CM/S
LEFT ICA MID SYS: 94.7 CM/S
LEFT ICA PROX DIAS: 46.2 CM/S
LEFT ICA PROX SYS: 101.1 CM/S
LEFT ICA/CCA SYS: 1.64
LEFT SUBCLAVIAN DIAS: 0 CM/S
LEFT SUBCLAVIAN SYS: 177.9 CM/S
LEFT VERTEBRAL DIAS: 13.87 CM/S
LEFT VERTEBRAL SYS: 36.6 CM/S
RIGHT CCA DIST DIAS: 18.6 CM/S
RIGHT CCA DIST SYS: 63.6 CM/S
RIGHT CCA PROX DIAS: 15.6 CM/S
RIGHT CCA PROX SYS: 137.8 CM/S
RIGHT ECA DIAS: 10.66 CM/S
RIGHT ECA SYS: 117.3 CM/S
RIGHT ICA DIST DIAS: 31.8 CM/S
RIGHT ICA DIST SYS: 84.9 CM/S
RIGHT ICA MID DIAS: 22.8 CM/S
RIGHT ICA MID SYS: 94 CM/S
RIGHT ICA PROX DIAS: 23.2 CM/S
RIGHT ICA PROX SYS: 80.1 CM/S
RIGHT ICA/CCA SYS: 1.5
RIGHT SUBCLAVIAN DIAS: 0 CM/S
RIGHT SUBCLAVIAN SYS: 159.5 CM/S
RIGHT VERTEBRAL DIAS: 8.55 CM/S
RIGHT VERTEBRAL SYS: 34.9 CM/S

## 2021-10-07 ENCOUNTER — TRANSCRIBE ORDER (OUTPATIENT)
Dept: SCHEDULING | Age: 66
End: 2021-10-07

## 2021-10-07 DIAGNOSIS — R13.10 DYSPHAGIA: Primary | ICD-10-CM

## 2021-10-13 ENCOUNTER — HOSPITAL ENCOUNTER (OUTPATIENT)
Dept: GENERAL RADIOLOGY | Age: 66
Discharge: HOME | End: 2021-10-13
Attending: NURSE PRACTITIONER
Payer: COMMERCIAL

## 2021-10-13 DIAGNOSIS — R13.10 DYSPHAGIA: ICD-10-CM

## 2021-10-13 PROCEDURE — 74220 X-RAY XM ESOPHAGUS 1CNTRST: CPT

## 2021-10-26 ENCOUNTER — TRANSCRIBE ORDER (OUTPATIENT)
Dept: SCHEDULING | Age: 66
End: 2021-10-26

## 2021-10-26 DIAGNOSIS — H53.19 OTHER SUBJECTIVE VISUAL DISTURBANCES: ICD-10-CM

## 2021-10-26 DIAGNOSIS — H02.534 EYELID RETRACTION LEFT UPPER EYELID: ICD-10-CM

## 2021-10-26 DIAGNOSIS — H25.13 AGE-RELATED NUCLEAR CATARACT, BILATERAL: Primary | ICD-10-CM

## 2021-10-26 DIAGNOSIS — H02.531 EYELID RETRACTION RIGHT UPPER EYELID: ICD-10-CM

## 2021-10-31 ENCOUNTER — HOSPITAL ENCOUNTER (OUTPATIENT)
Dept: MRI IMAGING | Age: 66
Discharge: HOME | End: 2021-10-31
Attending: OPHTHALMOLOGY
Payer: COMMERCIAL

## 2021-10-31 DIAGNOSIS — H02.534 EYELID RETRACTION LEFT UPPER EYELID: ICD-10-CM

## 2021-10-31 DIAGNOSIS — H25.13 AGE-RELATED NUCLEAR CATARACT, BILATERAL: ICD-10-CM

## 2021-10-31 DIAGNOSIS — H02.531 EYELID RETRACTION RIGHT UPPER EYELID: ICD-10-CM

## 2021-10-31 DIAGNOSIS — H53.19 OTHER SUBJECTIVE VISUAL DISTURBANCES: ICD-10-CM

## 2021-10-31 PROCEDURE — 70551 MRI BRAIN STEM W/O DYE: CPT

## 2022-02-03 ENCOUNTER — VIRTUAL VISIT (OUTPATIENT)
Dept: NEUROLOGY | Age: 67
End: 2022-02-03
Payer: COMMERCIAL

## 2022-02-03 DIAGNOSIS — G56.03 BILATERAL CARPAL TUNNEL SYNDROME: ICD-10-CM

## 2022-02-03 DIAGNOSIS — E53.8 B12 DEFICIENCY: ICD-10-CM

## 2022-02-03 DIAGNOSIS — I65.23 BILATERAL CAROTID ARTERY STENOSIS: ICD-10-CM

## 2022-02-03 DIAGNOSIS — E11.42 DIABETIC PERIPHERAL NEUROPATHY ASSOCIATED WITH TYPE 2 DIABETES MELLITUS (HCC): ICD-10-CM

## 2022-02-03 DIAGNOSIS — G56.22 ULNAR NEUROPATHY AT ELBOW OF LEFT UPPER EXTREMITY: ICD-10-CM

## 2022-02-03 DIAGNOSIS — G60.9 IDIOPATHIC SMALL FIBER PERIPHERAL NEUROPATHY: ICD-10-CM

## 2022-02-03 DIAGNOSIS — G43.109 MIGRAINE WITH AURA AND WITHOUT STATUS MIGRAINOSUS, NOT INTRACTABLE: ICD-10-CM

## 2022-02-03 DIAGNOSIS — M54.16 LUMBAR BACK PAIN WITH RADICULOPATHY AFFECTING RIGHT LOWER EXTREMITY: Primary | ICD-10-CM

## 2022-02-03 DIAGNOSIS — M54.16 LUMBAR BACK PAIN WITH RADICULOPATHY AFFECTING LEFT LOWER EXTREMITY: ICD-10-CM

## 2022-02-03 DIAGNOSIS — G60.8 IDIOPATHIC SMALL FIBER SENSORY NEUROPATHY: ICD-10-CM

## 2022-02-03 PROCEDURE — 99214 OFFICE O/P EST MOD 30 MIN: CPT | Performed by: PSYCHIATRY & NEUROLOGY

## 2022-02-03 RX ORDER — PANTOPRAZOLE SODIUM 40 MG/1
TABLET, DELAYED RELEASE ORAL
COMMUNITY
Start: 2021-12-13

## 2022-02-03 RX ORDER — GABAPENTIN 100 MG/1
100 CAPSULE ORAL 3 TIMES DAILY
Qty: 100 CAPSULE | Refills: 5 | Status: SHIPPED | OUTPATIENT
Start: 2022-02-03

## 2022-02-03 NOTE — LETTER
2/3/2022 8:46 PM    Patient: Estrella Goodwin   YOB: 1955  Date of Visit: 2/3/2022      Dear   No Recipients: Thank you for referring Ms. Estrella Goodwin to me for evaluation/treatment. Below are the relevant portions of my assessment and plan of care. Consult  REFERRED BY:  Mariangel Ball MD    CHIEF COMPLAINT: History of migraine headaches, and history of possible aneurysm      Subjective: Estrella Goodwin is a 73 y.o. right-handed  female, seen at the request of Dr. Kane Galindo of new problem of patient having increasing pain in her legs, and burning pain, and peeling all her neuropathy is getting worse. She had a normal EMG in the past, but then had a skin biopsy that showed small fiber neuropathy as manifest by nerve fiber loss on 2-3 biopsy sites. She has diabetic, borderline type, and her last hemoglobin A1c a year ago was 5.7, up from 5.6, and she had a low B12 level has not been checked again recently. She also was found to have a thyroid mass with a negative biopsy recently, as she had not been placed on CPAP because of obstructive sleep apnea. She has no other toxin exposure, she denies any major increase in back pain. She had a previous aneurysm versus infundibulum in the past.  She has been taking supplements so we will repeat her levels to make sure she is back in the therapeutic range and does not have pernicious anemia and need injections of B12. Her EMG study did show the bilateral carpal tunnel syndromes, right greater than left and she had hand surgery done with Dr. China Jack and feels much better with that. Her EMG study did not show any clear evidence of neuropathy suggesting small fiber neuropathy, and her skin biopsy did indicate that.   Patient had a question of an aneurysm followed for 2 years, but the last MRA scan showed it was more likely an infundibulum than an aneurysm told that it really seems stable we will probably repeat that in another year or 2 just to be sure.  The patient had a paternal grandmother  of a cerebral hemorrhage from cerebral aneurysm, and patient's brother dying of a cerebral hemorrhage and aneurysm recently, and patient having a past history of possible aneurysm found 2016 on an MRA of the brain. It was in the right posterior cerebral P1 branch, and was described as either a possible aneurysm or infundibulum. Patient also has new problem of visual scotomata in her peripheral visual field at times but not associated with headaches that only lasts about 10 to 15 minutes and I told her though most likely will be migraine auras because the auras persist but not always the headache as we get older. Her scans of her head have not shown any other vascular or structural lesion of the brain to explain it. She does have migraine headaches, but no major increased recently. Her aneurysm size is measured at 2 mm 4 years ago. Otherwise the MRI scan was normal.  She had no other focal weakness, sensory loss, head trauma, fever, meningismus, or any other focal neurological deficit, no cognitive impairment and no cranial nerve problems. Her past medical history pertinent for kidney stones, essential hypertension, skin cancer on her nose, and coronary artery disease. Bowel and bladder function remain stable.     Past Medical History:   Diagnosis Date    At risk for sleep apnea     during phone assessment with PAT    Basal cell carcinoma (BCC)     and squamous cell on nose, treated with chemo cream    CAD (coronary artery disease) 2019    stent and NSTEMI    Cataracts, bilateral     Chronic kidney disease     stage iv, after heart attack and two angio's back to back, back to regular function per pt, [place]    Gout     Hypertension     Kidney stone     Tinnitus aurium, bilateral       Past Surgical History:   Procedure Laterality Date    HX APPENDECTOMY      HX BREAST BIOPSY Right 2021    HX  SECTION      x3    HX HEART CATHETERIZATION  2019, 7/2020    x4 stents placed in 2019    HX HEENT      Tonsilectomy    HX HYSTERECTOMY      HX LITHOTRIPSY      bunches per pt    HX MOHS PROCEDURES      nose    HX OTHER SURGICAL Right     bunionectomy     Family History   Problem Relation Age of Onset    Cancer Mother     Diabetes Mother     Heart Disease Mother     Hypertension Mother     Kidney Disease Mother     Diabetes Father     Kidney Disease Father         had transplant    Heart Disease Father     Other Brother         cerebral aneurysm    Other Maternal Grandmother         cerebral aneurysm    Sleep Apnea Son       Social History     Tobacco Use    Smoking status: Former Smoker     Packs/day: 0.50     Years: 30.00     Pack years: 15.00     Quit date: 2019     Years since quittin.4    Smokeless tobacco: Never Used   Substance Use Topics    Alcohol use: Yes     Comment: once or twice a month         Current Outpatient Medications:     pantoprazole (PROTONIX) 40 mg tablet, TAKE 1 (ONE) TABLET DAILY - TAKE 30 MINUTES BEFORE BREAKFAST ON EMPTY STOMACH, Disp: , Rfl:     gabapentin (NEURONTIN) 100 mg capsule, Take 1 Capsule by mouth three (3) times daily. Max Daily Amount: 300 mg., Disp: 100 Capsule, Rfl: 5    b complex vitamins tablet, Take 1 Tablet by mouth daily. , Disp: , Rfl:     cholecalciferol, vitamin D3, (Vitamin D3) 50 mcg (2,000 unit) tab, Take 2,000 Units by mouth., Disp: , Rfl:     carvediloL (COREG) 6.25 mg tablet, Take 6.25 mg by mouth two (2) times daily (with meals). , Disp: , Rfl:     rosuvastatin (CRESTOR) 20 mg tablet, Take 1 Tab by mouth nightly., Disp: 60 Tab, Rfl: 3    aspirin 81 mg chewable tablet, Take 1 Tab by mouth daily. , Disp: 60 Tab, Rfl: 3    losartan (COZAAR) 100 mg tablet, Take 1 Tab by mouth daily (with breakfast). , Disp: 60 Tab, Rfl: 3        Allergies   Allergen Reactions    Morphine Rash    Pcn [Penicillins] Unknown (comments)     Mother and grandfather were severely allergic to it, pt has never had    Renografin-60 [Diatrizoate Christine-Diatrizoat Sod] Angioedema      MRI Results (most recent):  Results from [place] encounter on 10/31/21    MRI BRAIN WO CONT    Addendum 11/9/2021 12:47 PM  Addendum: TECHNIQUE:  MRI of the brain and orbits was performed WITHOUT contrast.    Postcontrast section in the findings of the report is in error and should be  disregarded. Narrative  INDICATION:  Dx: Age-related nuclear cataract, bilateral [H25.13 (ICD-10-CM)]; Eyelid retraction right upper eyelid [H02.531 (ICD-10-CM)]; Other subjective  visual disturbances [H53.19 (ICD-10-CM)]; Eyelid retraction left upper eyelid  [H02.534 (ICD-10-CM)]    COMPARISON:  None    TECHNIQUE:  Multiplanar multisequence acquisition without and with contrast of  the brain/orbits. FINDINGS:    Ventricles:  Midline, no hydrocephalus. Intracranial Hemorrhage:  No acute hemorrhage. Punctate foci of susceptibility  artifact near the right lateral ventricular atrium may represent calcifications  or chronic microhemorrhages. Brain Parenchyma/Brainstem:  Mild chronic T2 hyperintensities in the  supratentorial white matter. No acute infarction. Basal Cisterns:  Normal.  Flow Voids:  Normal.  Extraocular Muscles:  Normal.  Optic Nerve Sheath Complex:  Normal  Intraorbital Fat/Lacrimal Glands:  Normal.  Globes/Lenses:  Normal.  Suprasellar Cistern/Optic Chiasm:  Normal.  Post Contrast:  No abnormal parenchymal or meningeal enhancement. Additional Comments:  N/A. Impression  1. No intraorbital abnormality. 2. Mild chronic white matter disease with no acute process. Results from [place] encounter on 10/31/21    MRI BRAIN WO CONT    Addendum 11/9/2021 12:47 PM  Addendum: TECHNIQUE:  MRI of the brain and orbits was performed WITHOUT contrast.    Postcontrast section in the findings of the report is in error and should be  disregarded. Narrative  INDICATION:  Dx:  Age-related nuclear cataract, bilateral [H25.13 (ICD-10-CM)]; Eyelid retraction right upper eyelid [H02.531 (ICD-10-CM)]; Other subjective  visual disturbances [H53.19 (ICD-10-CM)]; Eyelid retraction left upper eyelid  [H02.534 (ICD-10-CM)]    COMPARISON:  None    TECHNIQUE:  Multiplanar multisequence acquisition without and with contrast of  the brain/orbits. FINDINGS:    Ventricles:  Midline, no hydrocephalus. Intracranial Hemorrhage:  No acute hemorrhage. Punctate foci of susceptibility  artifact near the right lateral ventricular atrium may represent calcifications  or chronic microhemorrhages. Brain Parenchyma/Brainstem:  Mild chronic T2 hyperintensities in the  supratentorial white matter. No acute infarction. Basal Cisterns:  Normal.  Flow Voids:  Normal.  Extraocular Muscles:  Normal.  Optic Nerve Sheath Complex:  Normal  Intraorbital Fat/Lacrimal Glands:  Normal.  Globes/Lenses:  Normal.  Suprasellar Cistern/Optic Chiasm:  Normal.  Post Contrast:  No abnormal parenchymal or meningeal enhancement. Additional Comments:  N/A. Impression  1. No intraorbital abnormality. 2. Mild chronic white matter disease with no acute process. 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 Objective:     I      NEUROLOGICAL EXAM:    Appearance: The patient is well developed, well nourished, provides a coherent history and is in no acute distress. Mental Status: Oriented to time, place and person, and the president, cognitive function is normal and speech is fluent and no aphasia or dysarthria. Mood and affect appropriate. Cranial Nerves:   Intact visual fields. Fundi are benign, disc are flat, no lesions seen on funduscopy at last visit but not testable this visit. EDWIN, EOM's full, no nystagmus, no ptosis.  Facial sensation is normal. Corneal reflexes are not tested. Facial movement is symmetric. Hearing is normal bilaterally. Palate is midline with normal sternocleidomastoid and trapezius muscles are normal. Tongue is midline. Neck without meningismus or bruits  Temporal arteries are not tender or enlarged  TMJ areas are not tender on palpation   Motor:  5/5 strength in upper and lower proximal and distal muscles. Normal bulk and tone. No fasciculations. Rapid alternating movement is symmetric and intact bilaterally   Reflexes:   Deep tendon reflexes 2+/4 and symmetrical and  No babinski or clonus present at last visit but not testable this visit   Sensory:   Normal to touch, pinprick and vibration and temperature and  DSS is intact at last visit but not testable this visit   Gait:  Normal gait for patient's age. Tremor:   No tremor noted. Cerebellar:  No abnormal cerebellar signs present on Romberg and tandem testing and finger-nose-finger exam.   Neurovascular:  Normal heart sounds and regular rhythm, peripheral pulses intact, and no carotid bruits at last visit but not testable this visit. Assessment:       ICD-10-CM ICD-9-CM    1. Lumbar back pain with radiculopathy affecting right lower extremity  M54.16 724.4 MARISSA COMPREHENSIVE PLUS PANEL      SED RATE (ESR)      VITAMIN B12 & FOLATE      ANTINEURONAL CELL AB      METABOLIC PANEL, COMPREHENSIVE      gabapentin (NEURONTIN) 100 mg capsule      HEMOGLOBIN A1C WITH EAG      MARISSA COMPREHENSIVE PLUS PANEL      SED RATE (ESR)      VITAMIN B12 & FOLATE      ANTINEURONAL CELL AB      METABOLIC PANEL, COMPREHENSIVE      HEMOGLOBIN A1C WITH EAG   2.  Idiopathic small fiber sensory neuropathy  G60.8 356.4 MARISSA COMPREHENSIVE PLUS PANEL      SED RATE (ESR)      VITAMIN B12 & FOLATE      ANTINEURONAL CELL AB      METABOLIC PANEL, COMPREHENSIVE      gabapentin (NEURONTIN) 100 mg capsule      HEMOGLOBIN A1C WITH EAG      MARISSA COMPREHENSIVE PLUS PANEL      SED RATE (ESR)      VITAMIN B12 & FOLATE ANTINEURONAL CELL AB      METABOLIC PANEL, COMPREHENSIVE      HEMOGLOBIN A1C WITH EAG   3. Migraine with aura and without status migrainosus, not intractable  G43.109 346.00 MARISSA COMPREHENSIVE PLUS PANEL      SED RATE (ESR)      VITAMIN B12 & FOLATE      ANTINEURONAL CELL AB      METABOLIC PANEL, COMPREHENSIVE      gabapentin (NEURONTIN) 100 mg capsule      HEMOGLOBIN A1C WITH EAG      MARISSA COMPREHENSIVE PLUS PANEL      SED RATE (ESR)      VITAMIN B12 & FOLATE      ANTINEURONAL CELL AB      METABOLIC PANEL, COMPREHENSIVE      HEMOGLOBIN A1C WITH EAG   4. Bilateral carotid artery stenosis  I65.23 433.10 MARISSA COMPREHENSIVE PLUS PANEL     433.30 SED RATE (ESR)      VITAMIN B12 & FOLATE      ANTINEURONAL CELL AB      METABOLIC PANEL, COMPREHENSIVE      gabapentin (NEURONTIN) 100 mg capsule      HEMOGLOBIN A1C WITH EAG      MARISSA COMPREHENSIVE PLUS PANEL      SED RATE (ESR)      VITAMIN B12 & FOLATE      ANTINEURONAL CELL AB      METABOLIC PANEL, COMPREHENSIVE      HEMOGLOBIN A1C WITH EAG   5. Ulnar neuropathy at elbow of left upper extremity  G56.22 354.2 MARISSA COMPREHENSIVE PLUS PANEL      SED RATE (ESR)      VITAMIN B12 & FOLATE      ANTINEURONAL CELL AB      METABOLIC PANEL, COMPREHENSIVE      gabapentin (NEURONTIN) 100 mg capsule      HEMOGLOBIN A1C WITH EAG      MARISSA COMPREHENSIVE PLUS PANEL      SED RATE (ESR)      VITAMIN B12 & FOLATE      ANTINEURONAL CELL AB      METABOLIC PANEL, COMPREHENSIVE      HEMOGLOBIN A1C WITH EAG   6. Lumbar back pain with radiculopathy affecting left lower extremity  M54.16 724.4 MARISAS COMPREHENSIVE PLUS PANEL      SED RATE (ESR)      VITAMIN B12 & FOLATE      ANTINEURONAL CELL AB      METABOLIC PANEL, COMPREHENSIVE      gabapentin (NEURONTIN) 100 mg capsule      HEMOGLOBIN A1C WITH EAG      MARISSA COMPREHENSIVE PLUS PANEL      SED RATE (ESR)      VITAMIN B12 & FOLATE      ANTINEURONAL CELL AB      METABOLIC PANEL, COMPREHENSIVE      HEMOGLOBIN A1C WITH EAG   7.  Bilateral carpal tunnel syndrome G56.03 354.0 MARISSA COMPREHENSIVE PLUS PANEL      SED RATE (ESR)      VITAMIN B12 & FOLATE      ANTINEURONAL CELL AB      METABOLIC PANEL, COMPREHENSIVE      gabapentin (NEURONTIN) 100 mg capsule      HEMOGLOBIN A1C WITH EAG      MARISSA COMPREHENSIVE PLUS PANEL      SED RATE (ESR)      VITAMIN B12 & FOLATE      ANTINEURONAL CELL AB      METABOLIC PANEL, COMPREHENSIVE      HEMOGLOBIN A1C WITH EAG   8. Idiopathic small fiber peripheral neuropathy  G60.9 356.9 MARISSA COMPREHENSIVE PLUS PANEL      SED RATE (ESR)      VITAMIN B12 & FOLATE      ANTINEURONAL CELL AB      METABOLIC PANEL, COMPREHENSIVE      gabapentin (NEURONTIN) 100 mg capsule      HEMOGLOBIN A1C WITH EAG      MARISSA COMPREHENSIVE PLUS PANEL      SED RATE (ESR)      VITAMIN B12 & FOLATE      ANTINEURONAL CELL AB      METABOLIC PANEL, COMPREHENSIVE      HEMOGLOBIN A1C WITH EAG   9. Diabetic peripheral neuropathy associated with type 2 diabetes mellitus (HCC)  E11.42 250.60 HEMOGLOBIN A1C WITH EAG     357.2 HEMOGLOBIN A1C WITH EAG   10. B12 deficiency  E53.8 266.2 VITAMIN B12 & FOLATE      VITAMIN B12 & FOLATE     Active Problems:    * No active hospital problems. *      Plan:     New problem of progressive neuropathic pain, most likely related to her diabetic neuropathy, but need to rule out B12 deficiency and need to rule out other causes of her neuropathy. For increasing pain we will give her gabapentin 100 mg 3 times a day as needed she can advance the dose as tolerated and indicated. Complete metabolic panel sent again to try to find other causes for neuropathy including B12, hemoglobin A1c, and autoimmune titers in addition. New problem of small fiber neuropathy found on skin biopsy, and we will recheck some of her lab studies to make sure that her vitamin D level is back to normal and her B12 level, and that her hemoglobin A1c has not gone elevated more than 5.7.   We told her to continue the vitamin D and the vitamin B12 and a One-A-Day vitamin and watch her blood sugars carefully as a single best treatment of this neuropathy. Her right hand is doing much better after carpal tunnel syndrome she is stable from that standpoint and does not have any weakness has normal sensation of the hand and is doing very well. We will follow that. Because of her aneurysm history she had last MRA that showed infundibulum was more likely an aneurysm so again we will keep following that in a year or 2. Her Doppler study last time was unremarkable. Patient will check results on my chart, or call us for results when done. She is encouraged to make sure that she controls her blood pressure well, as the main risk factor for aneurysm growth. Patient's back pain is somewhat better with exercises and her EMG did not show any clear signs of a lumbar motor radiculopathy, we will continue conservative management of that. Follow-up in 6 months time or earlier if need be if there is only a small aneurysm, and she may need referral to interventional neuroradiology should she have any enlargement or growth of the aneurysm. We will follow closely, 34 minutes spent with the patient, over history, reviewing her films personally on the PACS system, and discussing her diagnosis prognosis and treatment options. Signed By: Mushtaq Pabon MD     February 3, 2022       CC: Giuliano Lara MD  FAX: [phone]     Anni Soares was seen by synchronous (real-time) audio-video technology on 02/03/22. Consent:  She and/or her healthcare decision maker is aware that this patient-initiated Telehealth encounter is a billable service, with coverage as determined by her insurance carrier. She is aware that she may receive a bill and has provided verbal consent to proceed: Yes    I was in the office while conducting this encounter.     Pursuant to the emergency declaration under the [facility] [place], [address]5 waiver authority and the [employer] Supplemental Appropriations Act, this Virtual  Visit was conducted, with patient's consent, to reduce the patient's risk of exposure to COVID-19 and provide continuity of care for an established patient. Services were provided through a video synchronous discussion virtually to substitute for in-person clinic visit. If you have questions, please do not hesitate to call me. I look forward to following Ms. Cortes along with you.         Sincerely,      Joni Haro MD

## 2022-02-04 NOTE — PROGRESS NOTES
Consult  REFERRED BY:  Pearl Sloan MD    CHIEF COMPLAINT: History of migraine headaches, and history of possible aneurysm      Subjective: Becca Glover is a 73 y.o. right-handed  female, seen at the request of Dr. Marisa Bueno of new problem of patient having increasing pain in her legs, and burning pain, and peeling all her neuropathy is getting worse. She had a normal EMG in the past, but then had a skin biopsy that showed small fiber neuropathy as manifest by nerve fiber loss on 2-3 biopsy sites. She has diabetic, borderline type, and her last hemoglobin A1c a year ago was 5.7, up from 5.6, and she had a low B12 level has not been checked again recently. She also was found to have a thyroid mass with a negative biopsy recently, as she had not been placed on CPAP because of obstructive sleep apnea. She has no other toxin exposure, she denies any major increase in back pain. She had a previous aneurysm versus infundibulum in the past.  She has been taking supplements so we will repeat her levels to make sure she is back in the therapeutic range and does not have pernicious anemia and need injections of B12. Her EMG study did show the bilateral carpal tunnel syndromes, right greater than left and she had hand surgery done with Dr. Sapna Dimas and feels much better with that. Her EMG study did not show any clear evidence of neuropathy suggesting small fiber neuropathy, and her skin biopsy did indicate that. Patient had a question of an aneurysm followed for 2 years, but the last MRA scan showed it was more likely an infundibulum than an aneurysm told that it really seems stable we will probably repeat that in another year or 2 just to be sure. The patient had a paternal grandmother  of a cerebral hemorrhage from cerebral aneurysm, and patient's brother dying of a cerebral hemorrhage and aneurysm recently, and patient having a past history of possible aneurysm found 2016 on an MRA of the brain.   It was in the right posterior cerebral P1 branch, and was described as either a possible aneurysm or infundibulum. Patient also has new problem of visual scotomata in her peripheral visual field at times but not associated with headaches that only lasts about 10 to 15 minutes and I told her though most likely will be migraine auras because the auras persist but not always the headache as we get older. Her scans of her head have not shown any other vascular or structural lesion of the brain to explain it. She does have migraine headaches, but no major increased recently. Her aneurysm size is measured at 2 mm 4 years ago. Otherwise the MRI scan was normal.  She had no other focal weakness, sensory loss, head trauma, fever, meningismus, or any other focal neurological deficit, no cognitive impairment and no cranial nerve problems. Her past medical history pertinent for kidney stones, essential hypertension, skin cancer on her nose, and coronary artery disease. Bowel and bladder function remain stable.     Past Medical History:   Diagnosis Date    At risk for sleep apnea     during phone assessment with PAT    Basal cell carcinoma (BCC)     and squamous cell on nose, treated with chemo cream    CAD (coronary artery disease) 2019    stent and NSTEMI    Cataracts, bilateral     Chronic kidney disease     stage iv, after heart attack and two angio's back to back, back to regular function per pt, [place]    Gout     Hypertension     Kidney stone     Tinnitus aurium, bilateral       Past Surgical History:   Procedure Laterality Date    HX APPENDECTOMY      HX BREAST BIOPSY Right 2021    HX  SECTION      x3    HX HEART CATHETERIZATION  , 7/2020    x4 stents placed in 2019    HX HEENT      Tonsilectomy    HX HYSTERECTOMY      HX LITHOTRIPSY      bunches per pt    HX MOHS PROCEDURES      nose    HX OTHER SURGICAL Right     bunionectomy     Family History   Problem Relation Age of Onset  Cancer Mother     Diabetes Mother     Heart Disease Mother     Hypertension Mother     Kidney Disease Mother     Diabetes Father     Kidney Disease Father         had transplant    Heart Disease Father     Other Brother         cerebral aneurysm    Other Maternal Grandmother         cerebral aneurysm    Sleep Apnea Son       Social History     Tobacco Use    Smoking status: Former Smoker     Packs/day: 0.50     Years: 30.00     Pack years: 15.00     Quit date: 2019     Years since quittin.4    Smokeless tobacco: Never Used   Substance Use Topics    Alcohol use: Yes     Comment: once or twice a month         Current Outpatient Medications:     pantoprazole (PROTONIX) 40 mg tablet, TAKE 1 (ONE) TABLET DAILY - TAKE 30 MINUTES BEFORE BREAKFAST ON EMPTY STOMACH, Disp: , Rfl:     gabapentin (NEURONTIN) 100 mg capsule, Take 1 Capsule by mouth three (3) times daily. Max Daily Amount: 300 mg., Disp: 100 Capsule, Rfl: 5    b complex vitamins tablet, Take 1 Tablet by mouth daily. , Disp: , Rfl:     cholecalciferol, vitamin D3, (Vitamin D3) 50 mcg (2,000 unit) tab, Take 2,000 Units by mouth., Disp: , Rfl:     carvediloL (COREG) 6.25 mg tablet, Take 6.25 mg by mouth two (2) times daily (with meals). , Disp: , Rfl:     rosuvastatin (CRESTOR) 20 mg tablet, Take 1 Tab by mouth nightly., Disp: 60 Tab, Rfl: 3    aspirin 81 mg chewable tablet, Take 1 Tab by mouth daily. , Disp: 60 Tab, Rfl: 3    losartan (COZAAR) 100 mg tablet, Take 1 Tab by mouth daily (with breakfast). , Disp: 60 Tab, Rfl: 3        Allergies   Allergen Reactions    Morphine Rash    Pcn [Penicillins] Unknown (comments)     Mother and grandfather were severely allergic to it, pt has never had    Renografin-60 [Diatrizoate Christine-Diatrizoat Sod] Angioedema      MRI Results (most recent):  Results from [place] encounter on 10/31/21    MRI BRAIN WO CONT    Addendum 2021 12:47 PM  Addendum: TECHNIQUE:  MRI of the brain and orbits was performed WITHOUT contrast.    Postcontrast section in the findings of the report is in error and should be  disregarded. Narrative  INDICATION:  Dx: Age-related nuclear cataract, bilateral [H25.13 (ICD-10-CM)]; Eyelid retraction right upper eyelid [H02.531 (ICD-10-CM)]; Other subjective  visual disturbances [H53.19 (ICD-10-CM)]; Eyelid retraction left upper eyelid  [H02.534 (ICD-10-CM)]    COMPARISON:  None    TECHNIQUE:  Multiplanar multisequence acquisition without and with contrast of  the brain/orbits. FINDINGS:    Ventricles:  Midline, no hydrocephalus. Intracranial Hemorrhage:  No acute hemorrhage. Punctate foci of susceptibility  artifact near the right lateral ventricular atrium may represent calcifications  or chronic microhemorrhages. Brain Parenchyma/Brainstem:  Mild chronic T2 hyperintensities in the  supratentorial white matter. No acute infarction. Basal Cisterns:  Normal.  Flow Voids:  Normal.  Extraocular Muscles:  Normal.  Optic Nerve Sheath Complex:  Normal  Intraorbital Fat/Lacrimal Glands:  Normal.  Globes/Lenses:  Normal.  Suprasellar Cistern/Optic Chiasm:  Normal.  Post Contrast:  No abnormal parenchymal or meningeal enhancement. Additional Comments:  N/A. Impression  1. No intraorbital abnormality. 2. Mild chronic white matter disease with no acute process. Results from [place] encounter on 10/31/21    MRI BRAIN WO CONT    Addendum 11/9/2021 12:47 PM  Addendum: TECHNIQUE:  MRI of the brain and orbits was performed WITHOUT contrast.    Postcontrast section in the findings of the report is in error and should be  disregarded. Narrative  INDICATION:  Dx: Age-related nuclear cataract, bilateral [H25.13 (ICD-10-CM)]; Eyelid retraction right upper eyelid [H02.531 (ICD-10-CM)]; Other subjective  visual disturbances [H53.19 (ICD-10-CM)];  Eyelid retraction left upper eyelid  [H02.534 (ICD-10-CM)]    COMPARISON:  None    TECHNIQUE:  Multiplanar multisequence acquisition without and with contrast of  the brain/orbits. FINDINGS:    Ventricles:  Midline, no hydrocephalus. Intracranial Hemorrhage:  No acute hemorrhage. Punctate foci of susceptibility  artifact near the right lateral ventricular atrium may represent calcifications  or chronic microhemorrhages. Brain Parenchyma/Brainstem:  Mild chronic T2 hyperintensities in the  supratentorial white matter. No acute infarction. Basal Cisterns:  Normal.  Flow Voids:  Normal.  Extraocular Muscles:  Normal.  Optic Nerve Sheath Complex:  Normal  Intraorbital Fat/Lacrimal Glands:  Normal.  Globes/Lenses:  Normal.  Suprasellar Cistern/Optic Chiasm:  Normal.  Post Contrast:  No abnormal parenchymal or meningeal enhancement. Additional Comments:  N/A. Impression  1. No intraorbital abnormality. 2. Mild chronic white matter disease with no acute process. 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 Objective:     I      NEUROLOGICAL EXAM:    Appearance: The patient is well developed, well nourished, provides a coherent history and is in no acute distress. Mental Status: Oriented to time, place and person, and the president, cognitive function is normal and speech is fluent and no aphasia or dysarthria. Mood and affect appropriate. Cranial Nerves:   Intact visual fields. Fundi are benign, disc are flat, no lesions seen on funduscopy at last visit but not testable this visit. EDWIN, EOM's full, no nystagmus, no ptosis. Facial sensation is normal. Corneal reflexes are not tested. Facial movement is symmetric. Hearing is normal bilaterally. Palate is midline with normal sternocleidomastoid and trapezius muscles are normal. Tongue is midline.   Neck without meningismus or bruits  Temporal arteries are not tender or enlarged  TMJ areas are not tender on palpation   Motor:  5/5 strength in upper and lower proximal and distal muscles. Normal bulk and tone. No fasciculations. Rapid alternating movement is symmetric and intact bilaterally   Reflexes:   Deep tendon reflexes 2+/4 and symmetrical and  No babinski or clonus present at last visit but not testable this visit   Sensory:   Normal to touch, pinprick and vibration and temperature and  DSS is intact at last visit but not testable this visit   Gait:  Normal gait for patient's age. Tremor:   No tremor noted. Cerebellar:  No abnormal cerebellar signs present on Romberg and tandem testing and finger-nose-finger exam.   Neurovascular:  Normal heart sounds and regular rhythm, peripheral pulses intact, and no carotid bruits at last visit but not testable this visit. Assessment:       ICD-10-CM ICD-9-CM    1. Lumbar back pain with radiculopathy affecting right lower extremity  M54.16 724.4 MARISSA COMPREHENSIVE PLUS PANEL      SED RATE (ESR)      VITAMIN B12 & FOLATE      ANTINEURONAL CELL AB      METABOLIC PANEL, COMPREHENSIVE      gabapentin (NEURONTIN) 100 mg capsule      HEMOGLOBIN A1C WITH EAG      MARISSA COMPREHENSIVE PLUS PANEL      SED RATE (ESR)      VITAMIN B12 & FOLATE      ANTINEURONAL CELL AB      METABOLIC PANEL, COMPREHENSIVE      HEMOGLOBIN A1C WITH EAG   2. Idiopathic small fiber sensory neuropathy  G60.8 356.4 MARISSA COMPREHENSIVE PLUS PANEL      SED RATE (ESR)      VITAMIN B12 & FOLATE      ANTINEURONAL CELL AB      METABOLIC PANEL, COMPREHENSIVE      gabapentin (NEURONTIN) 100 mg capsule      HEMOGLOBIN A1C WITH EAG      MARISSA COMPREHENSIVE PLUS PANEL      SED RATE (ESR)      VITAMIN B12 & FOLATE      ANTINEURONAL CELL AB      METABOLIC PANEL, COMPREHENSIVE      HEMOGLOBIN A1C WITH EAG   3.  Migraine with aura and without status migrainosus, not intractable  G43.109 346.00 MARISSA COMPREHENSIVE PLUS PANEL      SED RATE (ESR)      VITAMIN B12 & FOLATE ANTINEURONAL CELL AB      METABOLIC PANEL, COMPREHENSIVE      gabapentin (NEURONTIN) 100 mg capsule      HEMOGLOBIN A1C WITH EAG      MARISSA COMPREHENSIVE PLUS PANEL      SED RATE (ESR)      VITAMIN B12 & FOLATE      ANTINEURONAL CELL AB      METABOLIC PANEL, COMPREHENSIVE      HEMOGLOBIN A1C WITH EAG   4. Bilateral carotid artery stenosis  I65.23 433.10 MARISSA COMPREHENSIVE PLUS PANEL     433.30 SED RATE (ESR)      VITAMIN B12 & FOLATE      ANTINEURONAL CELL AB      METABOLIC PANEL, COMPREHENSIVE      gabapentin (NEURONTIN) 100 mg capsule      HEMOGLOBIN A1C WITH EAG      MARISSA COMPREHENSIVE PLUS PANEL      SED RATE (ESR)      VITAMIN B12 & FOLATE      ANTINEURONAL CELL AB      METABOLIC PANEL, COMPREHENSIVE      HEMOGLOBIN A1C WITH EAG   5. Ulnar neuropathy at elbow of left upper extremity  G56.22 354.2 MARISSA COMPREHENSIVE PLUS PANEL      SED RATE (ESR)      VITAMIN B12 & FOLATE      ANTINEURONAL CELL AB      METABOLIC PANEL, COMPREHENSIVE      gabapentin (NEURONTIN) 100 mg capsule      HEMOGLOBIN A1C WITH EAG      MARISSA COMPREHENSIVE PLUS PANEL      SED RATE (ESR)      VITAMIN B12 & FOLATE      ANTINEURONAL CELL AB      METABOLIC PANEL, COMPREHENSIVE      HEMOGLOBIN A1C WITH EAG   6. Lumbar back pain with radiculopathy affecting left lower extremity  M54.16 724.4 MARISSA COMPREHENSIVE PLUS PANEL      SED RATE (ESR)      VITAMIN B12 & FOLATE      ANTINEURONAL CELL AB      METABOLIC PANEL, COMPREHENSIVE      gabapentin (NEURONTIN) 100 mg capsule      HEMOGLOBIN A1C WITH EAG      MARISSA COMPREHENSIVE PLUS PANEL      SED RATE (ESR)      VITAMIN B12 & FOLATE      ANTINEURONAL CELL AB      METABOLIC PANEL, COMPREHENSIVE      HEMOGLOBIN A1C WITH EAG   7.  Bilateral carpal tunnel syndrome  G56.03 354.0 MARISSA COMPREHENSIVE PLUS PANEL      SED RATE (ESR)      VITAMIN B12 & FOLATE      ANTINEURONAL CELL AB      METABOLIC PANEL, COMPREHENSIVE      gabapentin (NEURONTIN) 100 mg capsule      HEMOGLOBIN A1C WITH EAG      MARISSA COMPREHENSIVE PLUS PANEL SED RATE (ESR)      VITAMIN B12 & FOLATE      ANTINEURONAL CELL AB      METABOLIC PANEL, COMPREHENSIVE      HEMOGLOBIN A1C WITH EAG   8. Idiopathic small fiber peripheral neuropathy  G60.9 356.9 MARISSA COMPREHENSIVE PLUS PANEL      SED RATE (ESR)      VITAMIN B12 & FOLATE      ANTINEURONAL CELL AB      METABOLIC PANEL, COMPREHENSIVE      gabapentin (NEURONTIN) 100 mg capsule      HEMOGLOBIN A1C WITH EAG      MARISSA COMPREHENSIVE PLUS PANEL      SED RATE (ESR)      VITAMIN B12 & FOLATE      ANTINEURONAL CELL AB      METABOLIC PANEL, COMPREHENSIVE      HEMOGLOBIN A1C WITH EAG   9. Diabetic peripheral neuropathy associated with type 2 diabetes mellitus (HCC)  E11.42 250.60 HEMOGLOBIN A1C WITH EAG     357.2 HEMOGLOBIN A1C WITH EAG   10. B12 deficiency  E53.8 266.2 VITAMIN B12 & FOLATE      VITAMIN B12 & FOLATE     Active Problems:    * No active hospital problems. *      Plan:     New problem of progressive neuropathic pain, most likely related to her diabetic neuropathy, but need to rule out B12 deficiency and need to rule out other causes of her neuropathy. For increasing pain we will give her gabapentin 100 mg 3 times a day as needed she can advance the dose as tolerated and indicated. Complete metabolic panel sent again to try to find other causes for neuropathy including B12, hemoglobin A1c, and autoimmune titers in addition. New problem of small fiber neuropathy found on skin biopsy, and we will recheck some of her lab studies to make sure that her vitamin D level is back to normal and her B12 level, and that her hemoglobin A1c has not gone elevated more than 5.7. We told her to continue the vitamin D and the vitamin B12 and a One-A-Day vitamin and watch her blood sugars carefully as a single best treatment of this neuropathy.   Her right hand is doing much better after carpal tunnel syndrome she is stable from that standpoint and does not have any weakness has normal sensation of the hand and is doing very well.  We will follow that. Because of her aneurysm history she had last MRA that showed infundibulum was more likely an aneurysm so again we will keep following that in a year or 2. Her Doppler study last time was unremarkable. Patient will check results on my chart, or call us for results when done. She is encouraged to make sure that she controls her blood pressure well, as the main risk factor for aneurysm growth. Patient's back pain is somewhat better with exercises and her EMG did not show any clear signs of a lumbar motor radiculopathy, we will continue conservative management of that. Follow-up in 6 months time or earlier if need be if there is only a small aneurysm, and she may need referral to interventional neuroradiology should she have any enlargement or growth of the aneurysm. We will follow closely, 34 minutes spent with the patient, over history, reviewing her films personally on the PACS system, and discussing her diagnosis prognosis and treatment options. Signed By: Yudith Rossi MD     February 3, 2022       CC: She Ribeiro MD  FAX: [phone]     Trevin Mims was seen by synchronous (real-time) audio-video technology on 02/03/22. Consent:  She and/or her healthcare decision maker is aware that this patient-initiated Telehealth encounter is a billable service, with coverage as determined by her insurance carrier. She is aware that she may receive a bill and has provided verbal consent to proceed: Yes    I was in the office while conducting this encounter. Pursuant to the emergency declaration under the [facility], 1135 waiver authority and the [employer] and [employer] Act, this Virtual  Visit was conducted, with patient's consent, to reduce the patient's risk of exposure to COVID-19 and provide continuity of care for an established patient.      Services were provided through a video synchronous discussion virtually to substitute for in-person clinic visit.

## 2022-03-18 PROBLEM — G43.109 MIGRAINE WITH AURA AND WITHOUT STATUS MIGRAINOSUS, NOT INTRACTABLE: Status: ACTIVE | Noted: 2020-11-20

## 2022-03-18 PROBLEM — G60.8 IDIOPATHIC SMALL FIBER SENSORY NEUROPATHY: Status: ACTIVE | Noted: 2020-12-16

## 2022-03-18 PROBLEM — I67.89 CEREBRAL MICROVASCULAR DISEASE: Status: ACTIVE | Noted: 2020-11-20

## 2022-03-19 PROBLEM — Z82.49 FAMILY HISTORY OF CEREBRAL ANEURYSM: Status: ACTIVE | Noted: 2020-11-20

## 2022-03-19 PROBLEM — G56.22 ULNAR NEUROPATHY AT ELBOW OF LEFT UPPER EXTREMITY: Status: ACTIVE | Noted: 2020-12-16

## 2022-03-19 PROBLEM — E53.8 B12 DEFICIENCY: Status: ACTIVE | Noted: 2020-12-16

## 2022-03-19 PROBLEM — G60.9 IDIOPATHIC SMALL FIBER PERIPHERAL NEUROPATHY: Status: ACTIVE | Noted: 2020-12-16

## 2022-03-19 PROBLEM — I65.23 BILATERAL CAROTID ARTERY STENOSIS: Status: ACTIVE | Noted: 2020-11-20

## 2022-03-19 PROBLEM — E55.9 VITAMIN D DEFICIENCY: Status: ACTIVE | Noted: 2020-12-16

## 2022-03-19 PROBLEM — G56.03 BILATERAL CARPAL TUNNEL SYNDROME: Status: ACTIVE | Noted: 2020-11-20

## 2022-03-19 PROBLEM — R07.9 CHEST PAIN: Status: ACTIVE | Noted: 2019-09-01

## 2022-03-19 PROBLEM — M54.16 LUMBAR BACK PAIN WITH RADICULOPATHY AFFECTING LEFT LOWER EXTREMITY: Status: ACTIVE | Noted: 2020-11-20

## 2022-03-20 PROBLEM — E03.4 HYPOTHYROIDISM DUE TO ACQUIRED ATROPHY OF THYROID: Status: ACTIVE | Noted: 2021-05-21

## 2022-03-20 PROBLEM — E11.42 DIABETIC PERIPHERAL NEUROPATHY ASSOCIATED WITH TYPE 2 DIABETES MELLITUS (HCC): Status: ACTIVE | Noted: 2021-05-21

## 2022-03-20 PROBLEM — I20.8 ANGINA AT REST (HCC): Status: ACTIVE | Noted: 2019-09-03

## 2022-03-20 PROBLEM — I20.89 ANGINA AT REST: Status: ACTIVE | Noted: 2019-09-03

## 2022-03-20 PROBLEM — M54.16 LUMBAR BACK PAIN WITH RADICULOPATHY AFFECTING RIGHT LOWER EXTREMITY: Status: ACTIVE | Noted: 2020-11-20

## 2022-03-20 PROBLEM — I67.1 CEREBRAL ANEURYSM WITHOUT RUPTURE: Status: ACTIVE | Noted: 2020-11-20

## 2023-04-03 PROBLEM — E11.42 DIABETIC PERIPHERAL NEUROPATHY ASSOCIATED WITH TYPE 2 DIABETES MELLITUS (HCC): Status: RESOLVED | Noted: 2020-12-16 | Resolved: 2021-01-14

## 2023-04-03 PROBLEM — E10.42 DIABETIC PERIPHERAL NEUROPATHY ASSOCIATED WITH TYPE 1 DIABETES MELLITUS (HCC): Status: RESOLVED | Noted: 2020-12-16 | Resolved: 2021-01-14

## (undated) DEVICE — KIT MFLD ISOLATN NACL CNTRST PRT TBNG SPIK W/ PRSS TRNSDUC

## (undated) DEVICE — KIT MED IMAG CNTRST AGNT W/ IOPAMIDOL REUSE

## (undated) DEVICE — SOLUTION IV 1000ML 0.9% SOD CHL

## (undated) DEVICE — DIAMONDBACK CORONARY, CLASSIC CROWN, 1.25MM, 135CM SHAFT: Brand: DIAMONDBACK CORONARY

## (undated) DEVICE — CATH GUID COR EB35 6FR 100CM -- LAUNCHER

## (undated) DEVICE — PACK PROCEDURE SURG HRT CATH

## (undated) DEVICE — GLIDESHEATH SLENDER ACCESS KIT: Brand: GLIDESHEATH SLENDER

## (undated) DEVICE — KIT HND CTRL 3 W STPCOCK ROT END 54IN PREM HI PRSS TBNG AT

## (undated) DEVICE — BIPOLAR FORCEPS CORD: Brand: VALLEYLAB

## (undated) DEVICE — BLADE KNF CRPL TUNN MINI DISP --

## (undated) DEVICE — ZIMMER® STERILE DISPOSABLE TOURNIQUET CUFF WITH PLC, DUAL PORT, SINGLE BLADDER, 18 IN. (46 CM)

## (undated) DEVICE — CANNULA NSL ORAL AD FOR CAPNOFLEX CO2 O2 AIRLFE

## (undated) DEVICE — SPECIAL PROCEDURE DRAPE 32" X 34": Brand: SPECIAL PROCEDURE DRAPE

## (undated) DEVICE — COVER LT HNDL PLAS RIG 1 PER PK

## (undated) DEVICE — HI-TORQUE VERSACORE MODIFIED J GUIDE WIRE SYSTEM 260 CM: Brand: HI-TORQUE VERSACORE

## (undated) DEVICE — WRAP COHESIVE W2INXL5YD TAN SELF ADH BNDG HND NON STERILE TEAR CARING

## (undated) DEVICE — Device: Brand: EAGLE EYE PLATINUM RX DIGITAL IVUS CATHETER

## (undated) DEVICE — 3M™ TEGADERM™ TRANSPARENT FILM DRESSING FRAME STYLE, 1624W, 2-3/8 IN X 2-3/4 IN (6 CM X 7 CM), 100/CT 4CT/CASE: Brand: 3M™ TEGADERM™

## (undated) DEVICE — GUIDEWIRE, VIPERWIRE ADAVNCE CORONARY FLOPPY, .012: DIA, .014" SPRING TIP, 1 PACK 325 CM: Brand: DIAMONDBACK CORONARY

## (undated) DEVICE — ANGIOGRAPHY KIT

## (undated) DEVICE — PREP SKN CHLRAPRP APL 26ML STR --

## (undated) DEVICE — SPLINT WR POS F/ARTERIAL ACC -- BX/10

## (undated) DEVICE — KENDALL DL ECG CABLE AND LEAD WIRE SYSTEM, 3-LEAD, SINGLE PATIENT USE: Brand: KENDALL

## (undated) DEVICE — SUTURE ETHLN SZ 4-0 L18IN NONABSORBABLE BLK L19MM PS-2 3/8 1667H

## (undated) DEVICE — LOOP,VESSEL,MAXI,BLUE,2/PK,STERILE: Brand: MEDLINE

## (undated) DEVICE — 3M™ TEGADERM™ TRANSPARENT FILM DRESSING FRAME STYLE, 1626W, 4 IN X 4-3/4 IN (10 CM X 12 CM), 50/CT 4CT/CASE: Brand: 3M™ TEGADERM™

## (undated) DEVICE — CATH BLLN ANGIO 5X12MM NC EUPHORIA RX

## (undated) DEVICE — Device

## (undated) DEVICE — TR BAND RADIAL ARTERY COMPRESSION DEVICE: Brand: TR BAND

## (undated) DEVICE — HAND I-LF: Brand: MEDLINE INDUSTRIES, INC.

## (undated) DEVICE — CATH GUID COR JR4.0S 6FR 100CM -- LAUNCHER

## (undated) DEVICE — CUSTOM KT PTCA INFL DEV K05 00052M

## (undated) DEVICE — CATHETER PTCA L138CM BLLN L20MM DIA2.5MM 0.023IN 14ATM

## (undated) DEVICE — CATHETER BLLN SPRINTER OTW 2MMX12MM 138CM

## (undated) DEVICE — COPILOT BLEEDBACK CONTROL VALVE: Brand: COPILOT

## (undated) DEVICE — STERILE POLYISOPRENE POWDER-FREE SURGICAL GLOVES: Brand: PROTEXIS

## (undated) DEVICE — CATHETER BLLN  SPRINTER 138CM 12MM DIA1.5MM CROSSING PROF

## (undated) DEVICE — TREK CORONARY DILATATION CATHETER 4.0 MM X 15 MM / RAPID-EXCHANGE: Brand: TREK

## (undated) DEVICE — PINNACLE PRECISION ACCESS SYSTEM INTRODUCER SHEATH: Brand: PINNACLE PRECISION ACCESS SYSTEM

## (undated) DEVICE — CATHETER BLLN 2MMX15MM 138CM SPRINTER OTW

## (undated) DEVICE — TREK CORONARY DILATATION CATHETER 3.0 MM X 15 MM / RAPID-EXCHANGE: Brand: TREK

## (undated) DEVICE — INFECTION CONTROL KIT SYS

## (undated) DEVICE — RUNTHROUGH NS EXTRA FLOPPY PTCA GUIDEWIRE: Brand: RUNTHROUGH

## (undated) DEVICE — RADIFOCUS OPTITORQUE ANGIOGRAPHIC CATHETER: Brand: OPTITORQUE

## (undated) DEVICE — DRESSING,GAUZE,XEROFORM,CURAD,1"X8",ST: Brand: CURAD

## (undated) DEVICE — HI-TORQUE WHISPER MS GUIDE WIRE .014 STRAIGHT TIP 3.0 CM X 300 CM: Brand: HI-TORQUE WHISPER

## (undated) DEVICE — SLING ARM LIFETEC ORTH UNIV --

## (undated) DEVICE — CONTINU-FLO SOLUTION SET, 2 INJECTION SITES, MALE LUER LOCK ADAPTER WITH RETRACTABLE COLLAR, LARGE BORE STOPCOCK WITH ROTATING MALE LUER LOCK EXTENSION SET, 2 INJECTION SITES, MALE LUER LOCK ADAPTER WITH RETRACTABLE COLLAR: Brand: INTERLINK/CONTINU-FLO

## (undated) DEVICE — BANDAGE,GAUZE,BULKEE II,4.5"X4.1YD,STRL: Brand: MEDLINE